# Patient Record
Sex: MALE | Race: WHITE | Employment: FULL TIME | URBAN - METROPOLITAN AREA
[De-identification: names, ages, dates, MRNs, and addresses within clinical notes are randomized per-mention and may not be internally consistent; named-entity substitution may affect disease eponyms.]

---

## 2017-01-20 ENCOUNTER — ANESTHESIA EVENT (OUTPATIENT)
Dept: SURGERY | Age: 47
End: 2017-01-20
Payer: COMMERCIAL

## 2017-01-21 NOTE — ANESTHESIA PREPROCEDURE EVALUATION
Anesthetic History   No history of anesthetic complications            Review of Systems / Medical History  Patient summary reviewed, nursing notes reviewed and pertinent labs reviewed    Pulmonary  Within defined limits                 Neuro/Psych   Within defined limits           Cardiovascular  Within defined limits                     GI/Hepatic/Renal  Within defined limits              Endo/Other        Arthritis     Other Findings            Physical Exam    Airway  Mallampati: II  TM Distance: > 6 cm  Neck ROM: normal range of motion   Mouth opening: Normal     Cardiovascular  Regular rate and rhythm,  S1 and S2 normal,  no murmur, click, rub, or gallop             Dental    Dentition: Upper dentition intact and Lower dentition intact     Pulmonary  Breath sounds clear to auscultation               Abdominal  GI exam deferred       Other Findings            Anesthetic Plan    ASA: 2  Anesthesia type: general          Induction: Intravenous  Anesthetic plan and risks discussed with: Patient

## 2017-01-23 ENCOUNTER — ANESTHESIA (OUTPATIENT)
Dept: SURGERY | Age: 47
End: 2017-01-23
Payer: COMMERCIAL

## 2017-01-23 ENCOUNTER — SURGERY (OUTPATIENT)
Age: 47
End: 2017-01-23

## 2017-01-23 PROCEDURE — 77030026438 HC STYL ET INTUB CARD -A: Performed by: ANESTHESIOLOGY

## 2017-01-23 PROCEDURE — 74011250636 HC RX REV CODE- 250/636: Performed by: PHYSICIAN ASSISTANT

## 2017-01-23 PROCEDURE — 77030008684 HC TU ET CUF COVD -B: Performed by: ANESTHESIOLOGY

## 2017-01-23 PROCEDURE — 74011250636 HC RX REV CODE- 250/636

## 2017-01-23 PROCEDURE — 77030013079 HC BLNKT BAIR HGGR 3M -A: Performed by: ANESTHESIOLOGY

## 2017-01-23 PROCEDURE — 74011000250 HC RX REV CODE- 250

## 2017-01-23 RX ORDER — KETAMINE HYDROCHLORIDE 10 MG/ML
INJECTION, SOLUTION INTRAMUSCULAR; INTRAVENOUS AS NEEDED
Status: DISCONTINUED | OUTPATIENT
Start: 2017-01-23 | End: 2017-01-23 | Stop reason: HOSPADM

## 2017-01-23 RX ORDER — ONDANSETRON 2 MG/ML
INJECTION INTRAMUSCULAR; INTRAVENOUS AS NEEDED
Status: DISCONTINUED | OUTPATIENT
Start: 2017-01-23 | End: 2017-01-23 | Stop reason: HOSPADM

## 2017-01-23 RX ORDER — LIDOCAINE HYDROCHLORIDE 20 MG/ML
INJECTION, SOLUTION EPIDURAL; INFILTRATION; INTRACAUDAL; PERINEURAL AS NEEDED
Status: DISCONTINUED | OUTPATIENT
Start: 2017-01-23 | End: 2017-01-23 | Stop reason: HOSPADM

## 2017-01-23 RX ORDER — DEXAMETHASONE SODIUM PHOSPHATE 4 MG/ML
INJECTION, SOLUTION INTRA-ARTICULAR; INTRALESIONAL; INTRAMUSCULAR; INTRAVENOUS; SOFT TISSUE AS NEEDED
Status: DISCONTINUED | OUTPATIENT
Start: 2017-01-23 | End: 2017-01-23 | Stop reason: HOSPADM

## 2017-01-23 RX ORDER — SODIUM CHLORIDE, SODIUM LACTATE, POTASSIUM CHLORIDE, CALCIUM CHLORIDE 600; 310; 30; 20 MG/100ML; MG/100ML; MG/100ML; MG/100ML
INJECTION, SOLUTION INTRAVENOUS
Status: DISCONTINUED | OUTPATIENT
Start: 2017-01-23 | End: 2017-01-23 | Stop reason: HOSPADM

## 2017-01-23 RX ORDER — PROPOFOL 10 MG/ML
INJECTION, EMULSION INTRAVENOUS AS NEEDED
Status: DISCONTINUED | OUTPATIENT
Start: 2017-01-23 | End: 2017-01-23 | Stop reason: HOSPADM

## 2017-01-23 RX ORDER — FENTANYL CITRATE 50 UG/ML
INJECTION, SOLUTION INTRAMUSCULAR; INTRAVENOUS AS NEEDED
Status: DISCONTINUED | OUTPATIENT
Start: 2017-01-23 | End: 2017-01-23 | Stop reason: HOSPADM

## 2017-01-23 RX ORDER — MIDAZOLAM HYDROCHLORIDE 1 MG/ML
INJECTION, SOLUTION INTRAMUSCULAR; INTRAVENOUS AS NEEDED
Status: DISCONTINUED | OUTPATIENT
Start: 2017-01-23 | End: 2017-01-23 | Stop reason: HOSPADM

## 2017-01-23 RX ADMIN — SODIUM CHLORIDE, SODIUM LACTATE, POTASSIUM CHLORIDE, CALCIUM CHLORIDE: 600; 310; 30; 20 INJECTION, SOLUTION INTRAVENOUS at 07:36

## 2017-01-23 RX ADMIN — FENTANYL CITRATE 25 MCG: 50 INJECTION, SOLUTION INTRAMUSCULAR; INTRAVENOUS at 07:36

## 2017-01-23 RX ADMIN — PROPOFOL 200 MG: 10 INJECTION, EMULSION INTRAVENOUS at 07:46

## 2017-01-23 RX ADMIN — FENTANYL CITRATE 50 MCG: 50 INJECTION, SOLUTION INTRAMUSCULAR; INTRAVENOUS at 07:48

## 2017-01-23 RX ADMIN — MIDAZOLAM HYDROCHLORIDE 3 MG: 1 INJECTION, SOLUTION INTRAMUSCULAR; INTRAVENOUS at 07:36

## 2017-01-23 RX ADMIN — ONDANSETRON 4 MG: 2 INJECTION INTRAMUSCULAR; INTRAVENOUS at 08:06

## 2017-01-23 RX ADMIN — DEXAMETHASONE SODIUM PHOSPHATE 8 MG: 4 INJECTION, SOLUTION INTRA-ARTICULAR; INTRALESIONAL; INTRAMUSCULAR; INTRAVENOUS; SOFT TISSUE at 07:56

## 2017-01-23 RX ADMIN — CEFAZOLIN 2 G: 1 INJECTION, POWDER, FOR SOLUTION INTRAMUSCULAR; INTRAVENOUS; PARENTERAL at 07:50

## 2017-01-23 RX ADMIN — KETAMINE HYDROCHLORIDE 30 MG: 10 INJECTION, SOLUTION INTRAMUSCULAR; INTRAVENOUS at 07:50

## 2017-01-23 RX ADMIN — FENTANYL CITRATE 25 MCG: 50 INJECTION, SOLUTION INTRAMUSCULAR; INTRAVENOUS at 07:51

## 2017-01-23 RX ADMIN — LIDOCAINE HYDROCHLORIDE 40 MG: 20 INJECTION, SOLUTION EPIDURAL; INFILTRATION; INTRACAUDAL; PERINEURAL at 07:46

## 2017-01-23 NOTE — ANESTHESIA POSTPROCEDURE EVALUATION
Post-Anesthesia Evaluation and Assessment    Patient: Rigoberto Juan MRN: [id number]  SSN: [id number]    YOB: 1970  Age: 56 y.o. Sex: male       Cardiovascular Function/Vital Signs  Visit Vitals    /75    Pulse (!) 40    Temp 36.7 °C (98 °F)    Resp 13    Ht 6' 3\" (1.905 m)    Wt 99.8 kg (220 lb 0.3 oz)    SpO2 97%    BMI 27.5 kg/m2       Patient is status post general anesthesia for Procedure(s):  LEFT KNEE HARDWARE REMOVAL. Nausea/Vomiting: None    Postoperative hydration reviewed and adequate. Pain:  Pain Scale 1: Numeric (0 - 10) (01/23/17 0937)  Pain Intensity 1: 4 (01/23/17 0937)   Managed    Neurological Status:   Neuro (WDL): Within Defined Limits (01/23/17 0937)  Neuro  Neurologic State: Alert (01/23/17 5592)  LUE Motor Response: Purposeful (01/23/17 0830)  LLE Motor Response: Purposeful (01/23/17 0830)  RUE Motor Response: Purposeful (01/23/17 0830)  RLE Motor Response: Purposeful (01/23/17 0830)   At baseline    Mental Status and Level of Consciousness: Arousable    Pulmonary Status:   O2 Device: Room air (01/23/17 0937)   Adequate oxygenation and airway patent    Complications related to anesthesia: None    Post-anesthesia assessment completed.  No concerns    Signed By: Mirna Hagen MD     January 23, 2017

## 2017-08-15 ENCOUNTER — OFFICE VISIT (OUTPATIENT)
Dept: FAMILY MEDICINE CLINIC | Age: 47
End: 2017-08-15

## 2017-08-15 VITALS
HEIGHT: 75 IN | SYSTOLIC BLOOD PRESSURE: 128 MMHG | TEMPERATURE: 98.7 F | HEART RATE: 66 BPM | RESPIRATION RATE: 16 BRPM | WEIGHT: 231.6 LBS | OXYGEN SATURATION: 98 % | BODY MASS INDEX: 28.8 KG/M2 | DIASTOLIC BLOOD PRESSURE: 70 MMHG

## 2017-08-15 DIAGNOSIS — K12.0 CANKER SORES ORAL: Primary | ICD-10-CM
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
## 2017-08-15 NOTE — MR AVS SNAPSHOT
Visit Information Date & Time Provider Department Dept. Phone Encounter #  
 8/15/2017  9:45 AM Vinayak Kowalski  [facility] [phone] [id number] Upcoming Health Maintenance Date Due DTaP/Tdap/Td series (1 - Tdap) 1/11/1992 INFLUENZA AGE 9 TO ADULT 8/1/2017 Allergies as of 8/15/2017  Review Complete On: 8/15/2017 By: Vinayak Kowalski MD  
  
 Severity Noted Reaction Type Reactions Nsaids (Non-steroidal Anti-inflammatory Drug)  08/15/2017    Other (comments) H/O Gastric bypass Pcn [Penicillins]  04/05/2013    Hives Current Immunizations  Never Reviewed No immunizations on file. Not reviewed this visit You Were Diagnosed With   
  
 Codes Comments Canker sores oral    -  Primary ICD-10-CM: K12.0 ICD-9-CM: 528.2 Vitals BP Pulse Temp Resp Height(growth percentile) Weight(growth percentile) 128/70 (BP 1 Location: Left arm, BP Patient Position: Sitting) 66 98.7 °F (37.1 °C) (Oral) 16 6' 3\" (1.905 m) 231 lb 9.6 oz (105.1 kg) SpO2 BMI Smoking Status 98% 28.95 kg/m2 Never Smoker Vitals History BMI and BSA Data Body Mass Index Body Surface Area  
 28.95 kg/m 2 2.36 m 2 Preferred Pharmacy Pharmacy Name Phone [address] [phone] Your Updated Medication List  
  
   
This list is accurate as of: 8/15/17 10:19 AM.  Always use your most recent med list.  
  
  
  
  
 calcium carbonate 600 mg calcium (1,500 mg) tablet Commonly known as:  Onsarah Fairelbach Take 600 mg by mouth two (2) times a day. cyanocobalamin (vitamin B-12) 1,500 mcg Tbdi Take  by mouth. FLINTSTONES COMPLETE Chew Generic drug:  pediatric multivitamin no.76 Take 1 Tab by mouth two (2) times a day. magic mouthwash solution Take 5 mL by mouth three (3) times daily as needed (painful mouth sores). Magic mouth wash  Maalox Lidocaine 2% viscous  Diphenhydramine oral solution   Pharmacy to mix equal portions of ingredients to a total volume as indicated in the dispense amount. TYLENOL EXTRA STRENGTH PO Take 2 Tabs by mouth as needed. Prescriptions Printed Refills  
 magic mouthwash solution 0 Sig: Take 5 mL by mouth three (3) times daily as needed (painful mouth sores). Magic mouth wash Maalox Lidocaine 2% viscous Diphenhydramine oral solution Pharmacy to mix equal portions of ingredients to a total volume as indicated in the dispense amount. Class: Print Route: Oral  
  
Patient Instructions Canker Sore: Care Instructions Your Care Instructions Canker sores are painful white sores in the mouth. They usually begin with a tingling feeling, followed by a red spot or bump that turns white. Canker sores appear most often on the tongue, inside the cheeks, and inside the lips. They can be very painful and can make talking, eating, and drinking difficult. A canker sore may form after an injury or stretching of tissues in the mouth, which can happen, for example, during a dental procedure or teeth cleaning. If you accidentally bite your tongue or the inside of your cheek, you may end up with a canker sore. Other possible causes are infection, certain foods, and stress. Canker sores are not contagious. The pain from your canker sore should decrease in 7 to 10 days, and it should heal completely in 1 to 3 weeks. In most cases, a canker sore will go away by itself. Home treatment can ease pain and discomfort. If you have a large or deep canker sore that does not seem to be getting better after 2 weeks, your doctor may prescribe medicine. Canker sores often come back again. Follow-up care is a key part of your treatment and safety.  Be sure to make and go to all appointments, and call your doctor if you are having problems. It's also a good idea to know your test results and keep a list of the medicines you take. How can you care for yourself at home? · Drink cold liquids, such as water or iced tea, or eat flavored ice pops or frozen juices. Use a straw to keep the liquid from coming in contact with your canker sore. · Eat soft, bland foods that are easy to chew and swallow, such as ice cream, custard, applesauce, cottage cheese, macaroni and cheese, soft-cooked eggs, yogurt, or cream soups. · Cut foods into small pieces, or grind, mash, blend, or puree foods to make them easier to chew and swallow. · While your canker sore heals, avoid coffee, chocolate, spicy and salty foods, citrus fruits, nuts, seeds, and tomatoes. · To soothe your canker sore and help it heal: ¨ Use an over-the-counter numbing medicine, such as Orabase or Anbesol. ¨ Dab a bit of Milk of Magnesia on the canker sore 3 or 4 times a day. · Put ice on your sore to reduce the pain. · Take anti-inflammatory medicines to reduce pain, as needed. These include ibuprofen (Advil, Motrin) and naproxen (Aleve). Read and follow all instructions on the label. · Use a soft-bristle toothbrush, and brush your teeth well but carefully. · Do not smoke or use spit tobacco. Tobacco can cause mouth problems and slow healing. If you need help quitting, talk to your doctor about stop-smoking programs and medicines. These can increase your chances of quitting for good. When should you call for help? Call your doctor now or seek immediate medical care if: 
· You have signs of infection, such as: 
¨ Increased pain, swelling, warmth, or redness. ¨ Red streaks leading from the area. ¨ Pus draining from the area. ¨ A fever. Watch closely for changes in your health, and be sure to contact your doctor if: 
· You do not get better as expected. Where can you learn more? Go to http://aguilar-maria luisa.info/. Enter V214 in the search box to learn more about \"Kinga Alcantar: Care Instructions. \" Current as of: December 28, 2016 Content Version: 11.3 © 3608-7981 [employer]. Care instructions adapted under license by [employer] (which disclaims liability or warranty for this information). If you have questions about a medical condition or this instruction, always ask your healthcare professional. [address] any warranty or liability for your use of this information. Introducing [facility]! [facility] introduces Cheggin patient portal. Now you can access parts of your medical record, email your doctor's office, and request medication refills online. 1. In your internet browser, go to https://[employer]. [employer] 2. Click on the First Time User? Click Here link in the Sign In box. You will see the New Member Sign Up page. 3. Enter your Cheggin Access Code exactly as it appears below. You will not need to use this code after youve completed the sign-up process. If you do not sign up before the expiration date, you must request a new code. · Cheggin Access Code: [email or web address] Expires: 11/13/2017  9:47 AM 
 
4. Enter the last four digits of your Social Security Number (xxxx) and Date of Birth (mm/dd/yyyy) as indicated and click Submit. You will be taken to the next sign-up page. 5. Create a Cheggin ID. This will be your Cheggin login ID and cannot be changed, so think of one that is secure and easy to remember. 6. Create a Cheggin password. You can change your password at any time. 7. Enter your Password Reset Question and Answer. This can be used at a later time if you forget your password. 8. Enter your e-mail address. You will receive e-mail notification when new information is available in [address] 9. Click Sign Up. You can now view and download portions of your medical record. 10. Click the Download Summary menu link to download a portable copy of your medical information. If you have questions, please visit the Frequently Asked Questions section of the [employer] website. Remember, [employer]ix is NOT to be used for urgent needs. For medical emergencies, dial 911. Now available from your iPhone and Android! Please provide this summary of care documentation to your next provider. Your primary care clinician is listed as CARMEN REZA. If you have any questions after today's visit, please call [phone].

## 2017-08-15 NOTE — PATIENT INSTRUCTIONS
Canker Sore: Care Instructions  Your Care Instructions  Canker sores are painful white sores in the mouth. They usually begin with a tingling feeling, followed by a red spot or bump that turns white. Canker sores appear most often on the tongue, inside the cheeks, and inside the lips. They can be very painful and can make talking, eating, and drinking difficult. A canker sore may form after an injury or stretching of tissues in the mouth, which can happen, for example, during a dental procedure or teeth cleaning. If you accidentally bite your tongue or the inside of your cheek, you may end up with a canker sore. Other possible causes are infection, certain foods, and stress. Canker sores are not contagious. The pain from your canker sore should decrease in 7 to 10 days, and it should heal completely in 1 to 3 weeks. In most cases, a canker sore will go away by itself. Home treatment can ease pain and discomfort. If you have a large or deep canker sore that does not seem to be getting better after 2 weeks, your doctor may prescribe medicine. Canker sores often come back again. Follow-up care is a key part of your treatment and safety. Be sure to make and go to all appointments, and call your doctor if you are having problems. It's also a good idea to know your test results and keep a list of the medicines you take. How can you care for yourself at home? · Drink cold liquids, such as water or iced tea, or eat flavored ice pops or frozen juices. Use a straw to keep the liquid from coming in contact with your canker sore. · Eat soft, bland foods that are easy to chew and swallow, such as ice cream, custard, applesauce, cottage cheese, macaroni and cheese, soft-cooked eggs, yogurt, or cream soups. · Cut foods into small pieces, or grind, mash, blend, or puree foods to make them easier to chew and swallow.   · While your canker sore heals, avoid coffee, chocolate, spicy and salty foods, citrus fruits, nuts, seeds, and tomatoes. · To soothe your canker sore and help it heal:  ¨ Use an over-the-counter numbing medicine, such as Orabase or Anbesol. ¨ Dab a bit of Milk of Magnesia on the canker sore 3 or 4 times a day. · Put ice on your sore to reduce the pain. · Take anti-inflammatory medicines to reduce pain, as needed. These include ibuprofen (Advil, Motrin) and naproxen (Aleve). Read and follow all instructions on the label. · Use a soft-bristle toothbrush, and brush your teeth well but carefully. · Do not smoke or use spit tobacco. Tobacco can cause mouth problems and slow healing. If you need help quitting, talk to your doctor about stop-smoking programs and medicines. These can increase your chances of quitting for good. When should you call for help? Call your doctor now or seek immediate medical care if:  · You have signs of infection, such as:  ¨ Increased pain, swelling, warmth, or redness. ¨ Red streaks leading from the area. ¨ Pus draining from the area. ¨ A fever. Watch closely for changes in your health, and be sure to contact your doctor if:  · You do not get better as expected. Where can you learn more? Go to http://aguilar-maria luisa.info/. Enter E773 in the search box to learn more about \"Canker Sore: Care Instructions. \"  Current as of: December 28, 2016  Content Version: 11.3  © 8172-7145 [employer]. Care instructions adapted under license by [employer] (which disclaims liability or warranty for this information). If you have questions about a medical condition or this instruction, always ask your healthcare professional. [place] [address] any warranty or liability for your use of this information.

## 2017-08-15 NOTE — PROGRESS NOTES
1. Have you been to the ER, urgent care clinic since your last visit? Hospitalized since your last visit? No    2. Have you seen or consulted any other health care providers outside of the [address] [place] [address] since your last visit? Include any pap smears or colon screening. No       Chief Complaint   Patient presents with    Mouth Lesions     For the past 1 1/2 weeks, patient noticed some mouth lesions, also has been having a sore throat, bilateral ear pain, and feels like something is caught in the back of the throat.       Not fasting

## 2019-03-23 ENCOUNTER — OFFICE VISIT (OUTPATIENT)
Dept: FAMILY MEDICINE CLINIC | Age: 49
End: 2019-03-23

## 2019-03-23 VITALS
BODY MASS INDEX: 31.08 KG/M2 | SYSTOLIC BLOOD PRESSURE: 137 MMHG | WEIGHT: 250 LBS | RESPIRATION RATE: 18 BRPM | DIASTOLIC BLOOD PRESSURE: 91 MMHG | OXYGEN SATURATION: 96 % | TEMPERATURE: 98.6 F | HEIGHT: 75 IN | HEART RATE: 61 BPM

## 2019-03-23 DIAGNOSIS — J11.1 INFLUENZA: Primary | ICD-10-CM

## 2019-03-23 RX ORDER — METHYLPREDNISOLONE 4 MG/1
TABLET ORAL
Qty: 1 DOSE PACK | Refills: 0 | Status: SHIPPED | OUTPATIENT
Start: 2019-03-23 | End: 2019-09-14

## 2019-03-23 RX ORDER — OSELTAMIVIR PHOSPHATE 75 MG/1
CAPSULE ORAL
COMMUNITY
End: 2019-09-14

## 2019-03-23 NOTE — PROGRESS NOTES
Assessment/Plan:     Diagnoses and all orders for this visit:    1. Influenza  -     methylPREDNISolone (MEDROL DOSEPACK) 4 mg tablet; UAD    He is traveling to [facility] soon. He will travel with Medrol although he will hold on taking as he is improving. Follow up as needed. Follow-up and Dispositions    · Return if symptoms worsen or fail to improve. Discussed expected course/resolution/complications of diagnosis in detail with patient.    Medication risks/benefits/costs/interactions/alternatives discussed with patient.    Pt was given after visit summary which includes diagnoses, current medications & vitals. Pt expressed understanding with the diagnosis and plan          Subjective:      Bina Caputo is a 51 y.o. male who presents for had concerns including Flu. Hospital Follow Up  Bina Caputo is seen for follow up from recent urgent care visit to a [address] [place] [address] on 3/20/2019. He contracted symptoms the same day. He is currently on Day 4 of illness. He presented with nausea, sore throat. He is taking his Tamiflu as directed & without any side effects. He reports symptoms are not changed. Medication Reconciliation reviewed today. He is taking Tussin without improvement. He did get a flu shot. Current Outpatient Medications   Medication Sig Dispense Refill    oseltamivir (TAMIFLU) 75 mg capsule Take  by mouth.  methylPREDNISolone (MEDROL DOSEPACK) 4 mg tablet UAD 1 Dose Pack 0    magic mouthwash solution Take 5 mL by mouth three (3) times daily as needed (painful mouth sores). Magic mouth wash   Maalox  Lidocaine 2% viscous   Diphenhydramine oral solution     Pharmacy to mix equal portions of ingredients to a total volume as indicated in the dispense amount. 60 mL 0    ACETAMINOPHEN (TYLENOL EXTRA STRENGTH PO) Take 2 Tabs by mouth as needed.  pediatric multivit comb no.76 (FLINTSTONES COMPLETE) chew Take 1 Tab by mouth two (2) times a day.       calcium carbonate (CALTREX) 600 mg (1,500 mg) tablet Take 600 mg by mouth two (2) times a day.  cyanocobalamin, vitamin B-12, 1,500 mcg TbDL Take  by mouth. Allergies   Allergen Reactions    Nsaids (Non-Steroidal Anti-Inflammatory Drug) Other (comments)     H/O Gastric bypass    Pcn [Penicillins] Hives       ROS:   Review of Systems   Constitutional: Positive for chills and malaise/fatigue. Negative for fever. HENT: Negative for congestion, ear pain, sinus pain and sore throat. Respiratory: Positive for cough. Negative for sputum production, shortness of breath and wheezing. Cardiovascular: Negative for chest pain. Neurological: Negative for seizures. Endo/Heme/Allergies: Negative for environmental allergies. Objective:     Visit Vitals  BP (!) 137/91   Pulse 61   Temp 98.6 °F (37 °C) (Oral)   Resp 18   Ht 6' 3\" (1.905 m)   Wt 250 lb (113.4 kg)   SpO2 96%   BMI 31.25 kg/m²       Vitals and Nurse Documentation reviewed. Physical Exam   Constitutional: He has a sickly appearance. No distress. HENT:   Right Ear: Tympanic membrane is not erythematous and not bulging. No middle ear effusion. Left Ear: Tympanic membrane is not erythematous and not bulging. No middle ear effusion. Nose: No rhinorrhea. Right sinus exhibits no maxillary sinus tenderness and no frontal sinus tenderness. Left sinus exhibits no maxillary sinus tenderness and no frontal sinus tenderness. Mouth/Throat: No oropharyngeal exudate or posterior oropharyngeal erythema. Eyes: EOM and lids are normal.   Cardiovascular: S1 normal and S2 normal. Exam reveals no gallop and no friction rub. No murmur heard. Pulmonary/Chest: Breath sounds normal. He has no wheezes. Lymphadenopathy:     He has no cervical adenopathy. Skin: Skin is warm and dry.    Psychiatric: Mood and affect normal.

## 2019-03-23 NOTE — PROGRESS NOTES
Chief Complaint   Patient presents with    Flu     Pt presents in office today with c/o cough, sore throat, body aches   Pt reports being diagnosis ed with the flu on 03/20/2019  Pt is taking Tamiflu    1. Have you been to the ER, urgent care clinic since your last visit? Hospitalized since your last visit? No    2. Have you seen or consulted any other health care providers outside of the [address] [place] [address] since your last visit? Include any pap smears or colon screening.  No

## 2019-03-23 NOTE — PATIENT INSTRUCTIONS
Influenza (Flu): Care Instructions  Your Care Instructions    Influenza (flu) is an infection in the lungs and breathing passages. It is caused by the influenza virus. There are different strains, or types, of the flu virus from year to year. Unlike the common cold, the flu comes on suddenly and the symptoms, such as a cough, congestion, fever, chills, fatigue, aches, and pains, are more severe. These symptoms may last up to 10 days. Although the flu can make you feel very sick, it usually doesn't cause serious health problems. Home treatment is usually all you need for flu symptoms. But your doctor may prescribe antiviral medicine to prevent other health problems, such as pneumonia, from developing. Older people and those who have a long-term health condition, such as lung disease, are most at risk for having pneumonia or other health problems. Follow-up care is a key part of your treatment and safety. Be sure to make and go to all appointments, and call your doctor if you are having problems. It's also a good idea to know your test results and keep a list of the medicines you take. How can you care for yourself at home? · Get plenty of rest.  · Drink plenty of fluids, enough so that your urine is light yellow or clear like water. If you have kidney, heart, or liver disease and have to limit fluids, talk with your doctor before you increase the amount of fluids you drink. · Take an over-the-counter pain medicine if needed, such as acetaminophen (Tylenol), ibuprofen (Advil, Motrin), or naproxen (Aleve), to relieve fever, headache, and muscle aches. Read and follow all instructions on the label. No one younger than 20 should take aspirin. It has been linked to Reye syndrome, a serious illness. · Do not smoke. Smoking can make the flu worse. If you need help quitting, talk to your doctor about stop-smoking programs and medicines. These can increase your chances of quitting for good.   · Breathe moist air from a hot shower or from a sink filled with hot water to help clear a stuffy nose. · Before you use cough and cold medicines, check the label. These medicines may not be safe for young children or for people with certain health problems. · If the skin around your nose and lips becomes sore, put some petroleum jelly on the area. · To ease coughing:  ? Drink fluids to soothe a scratchy throat. ? Suck on cough drops or plain hard candy. ? Take an over-the-counter cough medicine that contains dextromethorphan to help you get some sleep. Read and follow all instructions on the label. ? Raise your head at night with an extra pillow. This may help you rest if coughing keeps you awake. · Take any prescribed medicine exactly as directed. Call your doctor if you think you are having a problem with your medicine. To avoid spreading the flu  · Wash your hands regularly, and keep your hands away from your face. · Stay home from school, work, and other public places until you are feeling better and your fever has been gone for at least 24 hours. The fever needs to have gone away on its own without the help of medicine. · Ask people living with you to talk to their doctors about preventing the flu. They may get antiviral medicine to keep from getting the flu from you. · To prevent the flu in the future, get a flu vaccine every fall. Encourage people living with you to get the vaccine. · Cover your mouth when you cough or sneeze. When should you call for help? Call 911 anytime you think you may need emergency care.  For example, call if:    · You have severe trouble breathing.    Call your doctor now or seek immediate medical care if:    · You have new or worse trouble breathing.     · You seem to be getting much sicker.     · You feel very sleepy or confused.     · You have a new or higher fever.     · You get a new rash.    Watch closely for changes in your health, and be sure to contact your doctor if:    · You begin to get better and then get worse.     · You are not getting better after 1 week. Where can you learn more? Go to http://aguilar-maria luisa.info/. Enter V798 in the search box to learn more about \"Influenza (Flu): Care Instructions. \"  Current as of: September 5, 2018  Content Version: 11.9  © 6193-3524 [employer]. Care instructions adapted under license by [employer] (which disclaims liability or warranty for this information). If you have questions about a medical condition or this instruction, always ask your healthcare professional. [place] [address] any warranty or liability for your use of this information.

## 2019-09-14 ENCOUNTER — HOSPITAL ENCOUNTER (EMERGENCY)
Age: 49
Discharge: HOME | End: 2019-09-14
Attending: EMERGENCY MEDICINE
Payer: COMMERCIAL

## 2019-09-14 ENCOUNTER — APPOINTMENT (OUTPATIENT)
Dept: CT IMAGING | Age: 49
End: 2019-09-14
Attending: NURSE PRACTITIONER
Payer: COMMERCIAL

## 2019-09-14 VITALS
RESPIRATION RATE: 16 BRPM | DIASTOLIC BLOOD PRESSURE: 88 MMHG | TEMPERATURE: 98.4 F | BODY MASS INDEX: 31.08 KG/M2 | HEART RATE: 59 BPM | WEIGHT: 250 LBS | SYSTOLIC BLOOD PRESSURE: 131 MMHG | OXYGEN SATURATION: 96 % | HEIGHT: 75 IN

## 2019-09-14 DIAGNOSIS — S09.90XA INJURY OF HEAD, INITIAL ENCOUNTER: Primary | ICD-10-CM

## 2019-09-14 DIAGNOSIS — R11.2 NON-INTRACTABLE VOMITING WITH NAUSEA, UNSPECIFIED VOMITING TYPE: ICD-10-CM

## 2019-09-14 DIAGNOSIS — Z78.9 ALCOHOL USE: ICD-10-CM

## 2019-09-14 LAB
COMMENT, HOLDF: NORMAL
SAMPLES BEING HELD,HOLD: NORMAL

## 2019-09-14 PROCEDURE — 74011250636 HC RX REV CODE- 250/636: Performed by: NURSE PRACTITIONER

## 2019-09-14 PROCEDURE — 99282 EMERGENCY DEPT VISIT SF MDM: CPT

## 2019-09-14 PROCEDURE — 74011000250 HC RX REV CODE- 250: Performed by: NURSE PRACTITIONER

## 2019-09-14 PROCEDURE — 74011250637 HC RX REV CODE- 250/637: Performed by: NURSE PRACTITIONER

## 2019-09-14 PROCEDURE — 70450 CT HEAD/BRAIN W/O DYE: CPT

## 2019-09-14 PROCEDURE — 96374 THER/PROPH/DIAG INJ IV PUSH: CPT

## 2019-09-14 RX ORDER — BUTALBITAL, ACETAMINOPHEN AND CAFFEINE 50; 325; 40 MG/1; MG/1; MG/1
1 TABLET ORAL
Status: COMPLETED | OUTPATIENT
Start: 2019-09-14 | End: 2019-09-14

## 2019-09-14 RX ORDER — ONDANSETRON 4 MG/1
4 TABLET, ORALLY DISINTEGRATING ORAL
Qty: 12 TAB | Refills: 0 | Status: SHIPPED | OUTPATIENT
Start: 2019-09-14

## 2019-09-14 RX ORDER — BACITRACIN 500 UNIT/G
1 PACKET (EA) TOPICAL
Status: COMPLETED | OUTPATIENT
Start: 2019-09-14 | End: 2019-09-14

## 2019-09-14 RX ORDER — ONDANSETRON 2 MG/ML
4 INJECTION INTRAMUSCULAR; INTRAVENOUS
Status: COMPLETED | OUTPATIENT
Start: 2019-09-14 | End: 2019-09-14

## 2019-09-14 RX ADMIN — SODIUM CHLORIDE 1000 ML: 900 INJECTION, SOLUTION INTRAVENOUS at 17:49

## 2019-09-14 RX ADMIN — BACITRACIN 1 PACKET: 500 OINTMENT TOPICAL at 17:52

## 2019-09-14 RX ADMIN — ONDANSETRON 4 MG: 2 INJECTION INTRAMUSCULAR; INTRAVENOUS at 17:49

## 2019-09-14 RX ADMIN — BUTALBITAL, ACETAMINOPHEN AND CAFFEINE 1 TABLET: 50; 325; 40 TABLET ORAL at 18:57

## 2019-09-14 NOTE — ED TRIAGE NOTES
Pt reports having a few beers and began to feel nauseated. Pt was in bathroom and fell hitting head on unknown object. Pt unsure in he had LOC. Incident happened 15 min ago.
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
## 2019-09-14 NOTE — DISCHARGE INSTRUCTIONS
Patient Education        Learning About a Closed Head Injury  What is a closed head injury? A closed head injury happens when your head gets hit hard. The strong force of the blow causes your brain to shake in your skull. This movement can cause the brain to bruise, swell, or tear. Sometimes nerves or blood vessels also get damaged. This can cause bleeding in or around the brain. A concussion is a type of closed head injury. What are the symptoms? If you have a mild concussion, you may have a mild headache or feel \"not quite right. \" These symptoms are common. They usually go away over a few days to 4 weeks. But sometimes after a concussion, you feel like you can't function as well as before the injury. And you have new symptoms. This is called postconcussive syndrome. You may:  · Find it harder to solve problems, think, concentrate, or remember. · Have headaches. · Have changes in your sleep patterns, such as not being able to sleep or sleeping all the time. · Have changes in your personality. · Not be interested in your usual activities. · Feel angry or anxious without a clear reason. · Lose your sense of taste or smell. · Be dizzy, lightheaded, or unsteady. It may be hard to stand or walk. How is a closed head injury treated? Any person who may have a concussion needs to see a doctor. Some people have to stay in the hospital to be watched. Others can go home safely. If you go home, follow your doctor's instructions. He or she will tell you if you need someone to watch you closely for the next 24 hours or longer. Rest is the best treatment. Get plenty of sleep at night. And try to rest during the day. · Avoid activities that are physically or mentally demanding. These include housework, exercise, and schoolwork. And don't play video games, send text messages, or use the computer. You may need to change your school or work schedule to be able to avoid these activities.   · Ask your doctor when it's okay to drive, ride a bike, or operate machinery. · Take an over-the-counter pain medicine, such as acetaminophen (Tylenol), ibuprofen (Advil, Motrin), or naproxen (Aleve). Be safe with medicines. Read and follow all instructions on the label. · Check with your doctor before you use any other medicines for pain. · Do not drink alcohol or use illegal drugs. They can slow recovery. They can also increase your risk of getting a second head injury. Follow-up care is a key part of your treatment and safety. Be sure to make and go to all appointments, and call your doctor if you are having problems. It's also a good idea to know your test results and keep a list of the medicines you take. Where can you learn more? Go to http://aguilar-maria luisa.info/. Enter E235 in the search box to learn more about \"Learning About a Closed Head Injury. \"  Current as of: March 28, 2019  Content Version: 12.1  © 0682-4949 [employer]. Care instructions adapted under license by [employer] (which disclaims liability or warranty for this information). If you have questions about a medical condition or this instruction, always ask your healthcare professional. [place] [address] any warranty or liability for your use of this information. Patient Education        Nausea and Vomiting: Care Instructions  Your Care Instructions    When you are nauseated, you may feel weak and sweaty and notice a lot of saliva in your mouth. Nausea often leads to vomiting. Most of the time you do not need to worry about nausea and vomiting, but they can be signs of other illnesses. Two common causes of nausea and vomiting are stomach flu and food poisoning. Nausea and vomiting from viral stomach flu will usually start to improve within 24 hours. Nausea and vomiting from food poisoning may last from 12 to 48 hours. The doctor has checked you carefully, but problems can develop later.  If you notice any problems or new symptoms, get medical treatment right away. Follow-up care is a key part of your treatment and safety. Be sure to make and go to all appointments, and call your doctor if you are having problems. It's also a good idea to know your test results and keep a list of the medicines you take. How can you care for yourself at home? · To prevent dehydration, drink plenty of fluids, enough so that your urine is light yellow or clear like water. Choose water and other caffeine-free clear liquids until you feel better. If you have kidney, heart, or liver disease and have to limit fluids, talk with your doctor before you increase the amount of fluids you drink. · Rest in bed until you feel better. · When you are able to eat, try clear soups, mild foods, and liquids until all symptoms are gone for 12 to 48 hours. Other good choices include dry toast, crackers, cooked cereal, and gelatin dessert, such as Jell-O. When should you call for help? Call 911 anytime you think you may need emergency care. For example, call if:    · You passed out (lost consciousness).    Call your doctor now or seek immediate medical care if:    · You have symptoms of dehydration, such as:  ? Dry eyes and a dry mouth. ? Passing only a little dark urine. ? Feeling thirstier than usual.     · You have new or worsening belly pain.     · You have a new or higher fever.     · You vomit blood or what looks like coffee grounds.    Watch closely for changes in your health, and be sure to contact your doctor if:    · You have ongoing nausea and vomiting.     · Your vomiting is getting worse.     · Your vomiting lasts longer than 2 days.     · You are not getting better as expected. Where can you learn more? Go to http://aguilar-maria luisa.info/. Enter 25 [id number] in the search box to learn more about \"Nausea and Vomiting: Care Instructions. \"  Current as of: September 23, 2018  Content Version: 12.1  © 8022-8573 [employer]. Care instructions adapted under license by [employer] (which disclaims liability or warranty for this information). If you have questions about a medical condition or this instruction, always ask your healthcare professional. [address] any warranty or liability for your use of this information.

## 2020-05-20 ENCOUNTER — VIRTUAL VISIT (OUTPATIENT)
Dept: FAMILY MEDICINE CLINIC | Age: 50
End: 2020-05-20

## 2020-05-20 DIAGNOSIS — Z13.0 SCREENING FOR ENDOCRINE, METABOLIC AND IMMUNITY DISORDER: ICD-10-CM

## 2020-05-20 DIAGNOSIS — R53.81 MALAISE AND FATIGUE: Primary | ICD-10-CM

## 2020-05-20 DIAGNOSIS — Z13.29 SCREENING FOR ENDOCRINE, METABOLIC AND IMMUNITY DISORDER: ICD-10-CM

## 2020-05-20 DIAGNOSIS — Z13.228 SCREENING FOR ENDOCRINE, METABOLIC AND IMMUNITY DISORDER: ICD-10-CM

## 2020-05-20 DIAGNOSIS — R06.83 SNORING: ICD-10-CM

## 2020-05-20 DIAGNOSIS — Z98.84 S/P GASTRIC BYPASS: ICD-10-CM

## 2020-05-20 DIAGNOSIS — R53.83 MALAISE AND FATIGUE: Primary | ICD-10-CM
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
## 2020-05-23 LAB
25(OH)D3+25(OH)D2 SERPL-MCNC: 22.8 NG/ML (ref 30–100)
ALBUMIN SERPL-MCNC: 4.5 G/DL (ref 4–5)
ALBUMIN/GLOB SERPL: 2 {RATIO} (ref 1.2–2.2)
ALP SERPL-CCNC: 70 IU/L (ref 39–117)
ALT SERPL-CCNC: 23 IU/L (ref 0–44)
AST SERPL-CCNC: 21 IU/L (ref 0–40)
BILIRUB SERPL-MCNC: 0.5 MG/DL (ref 0–1.2)
BUN SERPL-MCNC: 8 MG/DL (ref 6–24)
BUN/CREAT SERPL: 11 (ref 9–20)
CALCIUM SERPL-MCNC: 9.4 MG/DL (ref 8.7–10.2)
CHLORIDE SERPL-SCNC: 102 MMOL/L (ref 96–106)
CHOLEST SERPL-MCNC: 133 MG/DL (ref 100–199)
CO2 SERPL-SCNC: 28 MMOL/L (ref 20–29)
CREAT SERPL-MCNC: 0.71 MG/DL (ref 0.76–1.27)
ERYTHROCYTE [DISTWIDTH] IN BLOOD BY AUTOMATED COUNT: 12.3 % (ref 11.6–15.4)
GLOBULIN SER CALC-MCNC: 2.3 G/DL (ref 1.5–4.5)
GLUCOSE SERPL-MCNC: 87 MG/DL (ref 65–99)
HBA1C MFR BLD: 5.3 % (ref 4.8–5.6)
HCT VFR BLD AUTO: 45.3 % (ref 37.5–51)
HDLC SERPL-MCNC: 44 MG/DL
HGB BLD-MCNC: 14.9 G/DL (ref 13–17.7)
INTERPRETATION, 910389: NORMAL
IRON SATN MFR SERPL: 24 % (ref 15–55)
IRON SERPL-MCNC: 97 UG/DL (ref 38–169)
LDLC SERPL CALC-MCNC: 74 MG/DL (ref 0–99)
MCH RBC QN AUTO: 28.7 PG (ref 26.6–33)
MCHC RBC AUTO-ENTMCNC: 32.9 G/DL (ref 31.5–35.7)
MCV RBC AUTO: 87 FL (ref 79–97)
PLATELET # BLD AUTO: 213 X10E3/UL (ref 150–450)
POTASSIUM SERPL-SCNC: 4.5 MMOL/L (ref 3.5–5.2)
PROT SERPL-MCNC: 6.8 G/DL (ref 6–8.5)
PSA SERPL-MCNC: 0.3 NG/ML (ref 0–4)
RBC # BLD AUTO: 5.19 X10E6/UL (ref 4.14–5.8)
REFLEX CRITERIA: NORMAL
SODIUM SERPL-SCNC: 141 MMOL/L (ref 134–144)
T4 FREE SERPL-MCNC: 1.09 NG/DL (ref 0.82–1.77)
TESTOST FREE SERPL-MCNC: 10.5 PG/ML (ref 6.8–21.5)
TESTOST SERPL-MCNC: 601.1 NG/DL (ref 264–916)
TIBC SERPL-MCNC: 406 UG/DL (ref 250–450)
TRIGL SERPL-MCNC: 76 MG/DL (ref 0–149)
TSH SERPL DL<=0.005 MIU/L-ACNC: 2.55 UIU/ML (ref 0.45–4.5)
UIBC SERPL-MCNC: 309 UG/DL (ref 111–343)
VIT B12 SERPL-MCNC: 419 PG/ML (ref 232–1245)
VLDLC SERPL CALC-MCNC: 15 MG/DL (ref 5–40)
WBC # BLD AUTO: 5.3 X10E3/UL (ref 3.4–10.8)

## 2020-06-11 ENCOUNTER — TELEPHONE (OUTPATIENT)
Dept: FAMILY MEDICINE CLINIC | Age: 50
End: 2020-06-11

## 2020-06-11 NOTE — TELEPHONE ENCOUNTER
----- Message from Iban Call sent at 6/11/2020 10:06 AM EDT -----  Regarding: Clark MCKEON/Telephone  Env Patient return call    Caller's first and last name and relationship (if not the patient):      Best contact number(s): [phone]      Whose call is being returned: Returning a missed call from Ajit Davis.        Details to clarify the request:      Iban Call

## 2020-06-11 NOTE — TELEPHONE ENCOUNTER
----- Message from Rocky Murguia sent at 6/11/2020 10:06 AM EDT -----  Regarding: Clark MCKEON/Telephone  Env Patient return call    Caller's first and last name and relationship (if not the patient):      Best contact number(s): [phone]      Whose call is being returned: Returning a missed call from [place].        Details to clarify the request:      Rocky Murguia      Returning your call   Please advise  BCB#[phone] (K)

## 2020-06-11 NOTE — TELEPHONE ENCOUNTER
Outbound call to patient.  No answer left message for patient that I was returning his call regarding lab results

## 2020-06-11 NOTE — TELEPHONE ENCOUNTER
Call placed to patient. Name and  verified. Reviewed recent lab results and recommendations.  Patient expressed understanding and was appreciative of call

## 2020-12-15 ENCOUNTER — OFFICE VISIT (OUTPATIENT)
Dept: FAMILY MEDICINE CLINIC | Age: 50
End: 2020-12-15
Payer: COMMERCIAL

## 2020-12-15 VITALS
HEART RATE: 63 BPM | BODY MASS INDEX: 34.24 KG/M2 | TEMPERATURE: 98.2 F | HEIGHT: 75 IN | OXYGEN SATURATION: 97 % | SYSTOLIC BLOOD PRESSURE: 141 MMHG | RESPIRATION RATE: 16 BRPM | WEIGHT: 275.4 LBS | DIASTOLIC BLOOD PRESSURE: 88 MMHG

## 2020-12-15 DIAGNOSIS — E66.9 OBESITY (BMI 30-39.9): ICD-10-CM

## 2020-12-15 DIAGNOSIS — M17.12 PRIMARY OSTEOARTHRITIS OF LEFT KNEE: ICD-10-CM

## 2020-12-15 DIAGNOSIS — M54.6 ACUTE RIGHT-SIDED THORACIC BACK PAIN: ICD-10-CM

## 2020-12-15 DIAGNOSIS — M25.50 POLYARTHRALGIA: Primary | ICD-10-CM

## 2020-12-15 PROCEDURE — 99214 OFFICE O/P EST MOD 30 MIN: CPT | Performed by: NURSE PRACTITIONER

## 2020-12-15 RX ORDER — DICLOFENAC SODIUM 10 MG/G
4 GEL TOPICAL 4 TIMES DAILY
Qty: 100 G | Refills: 3 | Status: SHIPPED | OUTPATIENT
Start: 2020-12-15

## 2020-12-15 NOTE — PROGRESS NOTES
[facility] Note    Roseanne Holly is a 53 y.o. male who was seen in clinic today (12/15/2020). Subjective:  Cardiovascular Review:  The patient has a h/o obesity and prediabetes. Diet and Lifestyle: generally follows a low fat low cholesterol diet, generally follows a low sodium diet, exercises regularly, nonsmoker  Home BP Monitoring: is not measured at home. Pertinent ROS: no TIA's, no chest pain on exertion, no dyspnea on exertion, no swelling of ankles.      Patient had gastric bypass surgery in 2007. Reports compliance with his vitamin supplements. He continues to exercise routinely and is an avid runner. Patient underwent left knee MCL repair in 1/2017. Patient reports ongoing bilateral foot pain and cracking of heels. Patient also reports ongoing cramping of back. Prior to Admission medications    Medication Sig Start Date End Date Taking? Authorizing Provider   diclofenac (VOLTAREN) 1 % gel Apply 4 g to affected area four (4) times daily. 12/15/20  Yes Aj Rodas NP   ACETAMINOPHEN (TYLENOL EXTRA STRENGTH PO) Take 2 Tabs by mouth as needed. Yes Provider, Historical   pediatric multivit comb no.76 (FLINTSTONES COMPLETE) chew Take 1 Tab by mouth two (2) times a day. Yes Provider, Historical   calcium carbonate (CALTREX) 600 mg (1,500 mg) tablet Take 600 mg by mouth two (2) times a day. Yes Provider, Historical   cyanocobalamin, vitamin B-12, 1,500 mcg TbDL Take  by mouth. Yes Provider, Historical   ondansetron (ZOFRAN ODT) 4 mg disintegrating tablet Take 1 Tab by mouth every eight (8) hours as needed for Nausea. 9/14/19   Shellie Shankar NP          Allergies   Allergen Reactions    Nsaids (Non-Steroidal Anti-Inflammatory Drug) Other (comments)     H/O Gastric bypass    Pcn [Penicillins] Hives           ROS  See HPI    Objective:   Physical Exam  Vitals signs and nursing note reviewed.    Constitutional:       Appearance: He is well-developed. Neck:      Musculoskeletal: Normal range of motion and neck supple. Thyroid: No thyromegaly. Vascular: No carotid bruit or JVD. Cardiovascular:      Rate and Rhythm: Normal rate and regular rhythm. Heart sounds: No murmur. No friction rub. No gallop. Pulmonary:      Effort: Pulmonary effort is normal. No respiratory distress. Breath sounds: Normal breath sounds. Lymphadenopathy:      Cervical: No cervical adenopathy. Neurological:      Mental Status: He is alert and oriented to person, place, and time. Psychiatric:         Behavior: Behavior normal.           Visit Vitals  BP (!) 141/88 (BP 1 Location: Right arm, BP Patient Position: Sitting)   Pulse 63   Temp 98.2 °F (36.8 °C) (Temporal)   Resp 16   Ht 6' 3\" (1.905 m)   Wt 275 lb 6.4 oz (124.9 kg)   SpO2 97%   BMI 34.42 kg/m²       Assessment & Plan:  Diagnoses and all orders for this visit:    1. Polyarthralgia  Rule out inflammatory joint disease. Referral to rheumatology for continued symptoms or abnormal labs. -     SED RATE (ESR); Future  -     RA + CCP ABS; Future  -     C REACTIVE PROTEIN, QT; Future    2. Acute right-sided thoracic back pain  Handout provided for home exercises. NSAIDs as needed for pain. Recommended chiropractic evaluation through [facility]    3. Primary osteoarthritis of left knee  Unable to take NSAIDs due to history of gastric bypass surgery. Begin diclofenac gel. Referral to orthopedics for continued symptoms. -     diclofenac (VOLTAREN) 1 % gel; Apply 4 g to affected area four (4) times daily. 4. Obesity (BMI 30-39. 9)  Recommended weight loss through calorie restriction and exercise. I have discussed the diagnosis with the patient and the intended plan as seen in the above orders. The patient has received an after-visit summary along with patient information handout.   I have discussed medication side effects and warnings with the patient as well. Follow-up and Dispositions    · Return if symptoms worsen or fail to improve.            All Yan, NP

## 2020-12-15 NOTE — PATIENT INSTRUCTIONS
Back Stretches: Exercises Introduction Here are some examples of exercises for stretching your back. Start each exercise slowly. Ease off the exercise if you start to have pain. Your doctor or physical therapist will tell you when you can start these exercises and which ones will work best for you. How to do the exercises Overhead stretch 1. Stand comfortably with your feet shoulder-width apart. 2. Looking straight ahead, raise both arms over your head and reach toward the ceiling. Do not allow your head to tilt back. 3. Hold for 15 to 30 seconds, then lower your arms to your sides. 4. Repeat 2 to 4 times. Side stretch 1. Stand comfortably with your feet shoulder-width apart. 2. Raise one arm over your head, and then lean to the other side. 3. Slide your hand down your leg as you let the weight of your arm gently stretch your side muscles. Hold for 15 to 30 seconds. 4. Repeat 2 to 4 times on each side. Press-up 1. Lie on your stomach, supporting your body with your forearms. 2. Press your elbows down into the floor to raise your upper back. As you do this, relax your stomach muscles and allow your back to arch without using your back muscles. As your press up, do not let your hips or pelvis come off the floor. 3. Hold for 15 to 30 seconds, then relax. 4. Repeat 2 to 4 times. Relax and rest  
1. Lie on your back with a rolled towel under your neck and a pillow under your knees. Extend your arms comfortably to your sides. 2. Relax and breathe normally. 3. Remain in this position for about 10 minutes. 4. If you can, do this 2 or 3 times each day. Follow-up care is a key part of your treatment and safety. Be sure to make and go to all appointments, and call your doctor if you are having problems. It's also a good idea to know your test results and keep a list of the medicines you take. Where can you learn more? Go to http://www.[employer].com/ Enter D216 in the search box to learn more about \"Back Stretches: Exercises. \" Current as of: March 2, 2020               Content Version: 12.6 © 2006-2020 [employer]. Care instructions adapted under license by [employer] (which disclaims liability or warranty for this information). If you have questions about a medical condition or this instruction, always ask your healthcare professional. [place] [address] any warranty or liability for your use of this information.

## 2020-12-15 NOTE — PROGRESS NOTES
Chief Complaint   Patient presents with    Foot Pain     heel pain.  Weight Gain    Generalized Body Aches     every morning     1. Have you been to the ER, urgent care clinic since your last visit? Hospitalized since your last visit? No    2. Have you seen or consulted any other health care providers outside of the [address] [place] [address] since your last visit? Include any pap smears or colon screening.  No

## 2020-12-16 LAB
CCP IGA+IGG SERPL IA-ACNC: 25 UNITS (ref 0–19)
CRP SERPL-MCNC: <0.29 MG/DL (ref 0–0.6)
ERYTHROCYTE [SEDIMENTATION RATE] IN BLOOD: 4 MM/HR (ref 0–15)
RHEUMATOID FACT SERPL-ACNC: <10 IU/ML (ref 0–13.9)

## 2021-01-19 ENCOUNTER — DOCUMENTATION ONLY (OUTPATIENT)
Dept: RHEUMATOLOGY | Age: 51
End: 2021-01-19

## 2021-01-20 ENCOUNTER — TELEPHONE (OUTPATIENT)
Dept: RHEUMATOLOGY | Age: 51
End: 2021-01-20

## 2021-01-20 ENCOUNTER — HOSPITAL ENCOUNTER (OUTPATIENT)
Dept: GENERAL RADIOLOGY | Age: 51
Discharge: HOME | End: 2021-01-20
Attending: PEDIATRICS
Payer: COMMERCIAL

## 2021-01-20 ENCOUNTER — OFFICE VISIT (OUTPATIENT)
Dept: RHEUMATOLOGY | Age: 51
End: 2021-01-20
Payer: COMMERCIAL

## 2021-01-20 VITALS
OXYGEN SATURATION: 96 % | DIASTOLIC BLOOD PRESSURE: 81 MMHG | TEMPERATURE: 97.8 F | WEIGHT: 273.2 LBS | RESPIRATION RATE: 16 BRPM | SYSTOLIC BLOOD PRESSURE: 134 MMHG | BODY MASS INDEX: 34.15 KG/M2 | HEART RATE: 63 BPM

## 2021-01-20 DIAGNOSIS — M19.09 OSTEOARTHRITIS OF OTHER SITE, UNSPECIFIED OSTEOARTHRITIS TYPE: Primary | ICD-10-CM

## 2021-01-20 DIAGNOSIS — M47.818 SI JOINT ARTHRITIS: Primary | ICD-10-CM

## 2021-01-20 DIAGNOSIS — M19.09 OSTEOARTHRITIS OF OTHER SITE, UNSPECIFIED OSTEOARTHRITIS TYPE: ICD-10-CM

## 2021-01-20 PROCEDURE — 73560 X-RAY EXAM OF KNEE 1 OR 2: CPT

## 2021-01-20 PROCEDURE — 72100 X-RAY EXAM L-S SPINE 2/3 VWS: CPT

## 2021-01-20 PROCEDURE — 99244 OFF/OP CNSLTJ NEW/EST MOD 40: CPT | Performed by: PEDIATRICS

## 2021-01-20 PROCEDURE — 73630 X-RAY EXAM OF FOOT: CPT

## 2021-01-20 PROCEDURE — 72050 X-RAY EXAM NECK SPINE 4/5VWS: CPT

## 2021-01-20 PROCEDURE — 72202 X-RAY EXAM SI JOINTS 3/> VWS: CPT

## 2021-01-20 PROCEDURE — 72070 X-RAY EXAM THORAC SPINE 2VWS: CPT

## 2021-01-20 RX ORDER — CELECOXIB 200 MG/1
400 CAPSULE ORAL 2 TIMES DAILY
Qty: 120 CAP | Refills: 3 | Status: SHIPPED | OUTPATIENT
Start: 2021-01-20 | End: 2021-02-19

## 2021-01-20 RX ORDER — DICLOFENAC SODIUM 10 MG/G
GEL TOPICAL 4 TIMES DAILY
Qty: 100 G | Refills: 3 | Status: SHIPPED | OUTPATIENT
Start: 2021-01-20 | End: 2021-02-19

## 2021-01-20 NOTE — TELEPHONE ENCOUNTER
Spoke to Rosmery Conrad (pharmacist at [facility]) clarified dosage for the pt diclofenac gel, Subhash Lindsay verbally acknowledged understanding

## 2021-01-20 NOTE — PROGRESS NOTES
Chief Complaint   Patient presents with    Joint Pain     1. Have you been to the ER, urgent care clinic since your last visit? Hospitalized since your last visit? No    2. Have you seen or consulted any other health care providers outside of the [address] [place] [address] since your last visit? Include any pap smears or colon screening.  No
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
## 2021-01-20 NOTE — TELEPHONE ENCOUNTER
----- Message from Nabeel Berman RN sent at 1/20/2021 12:00 PM EST -----  Regarding: FW: Dr Esteban/telephone    ----- Message -----  From: Barrington Hawkins  Sent: 1/20/2021  10:12 AM EST  To: [facility] Nurse Pool  Subject: Dr Lona Ying is calling regarding the Diclofenac sodium 1 percent Gel, need the grams per doc to bill to insurance, please call [phone]

## 2021-01-27 ENCOUNTER — HOSPITAL ENCOUNTER (OUTPATIENT)
Dept: MRI IMAGING | Age: 51
Discharge: HOME | End: 2021-01-27
Attending: PEDIATRICS

## 2021-01-27 VITALS — WEIGHT: 270 LBS | BODY MASS INDEX: 33.75 KG/M2

## 2021-01-27 DIAGNOSIS — M47.818 SI JOINT ARTHRITIS: ICD-10-CM

## 2021-01-27 RX ORDER — GADOTERATE MEGLUMINE 376.9 MG/ML
20 INJECTION INTRAVENOUS
Status: DISCONTINUED | OUTPATIENT
Start: 2021-01-27 | End: 2021-01-28 | Stop reason: HOSPADM

## 2021-01-28 ENCOUNTER — HOSPITAL ENCOUNTER (OUTPATIENT)
Dept: MRI IMAGING | Age: 51
Discharge: HOME | End: 2021-01-28
Attending: PEDIATRICS
Payer: COMMERCIAL

## 2021-01-28 VITALS — BODY MASS INDEX: 33.75 KG/M2 | WEIGHT: 270 LBS

## 2021-01-28 PROCEDURE — A9575 INJ GADOTERATE MEGLUMI 0.1ML: HCPCS | Performed by: PEDIATRICS

## 2021-01-28 PROCEDURE — 74011250636 HC RX REV CODE- 250/636: Performed by: PEDIATRICS

## 2021-01-28 PROCEDURE — 72197 MRI PELVIS W/O & W/DYE: CPT

## 2021-01-28 RX ORDER — GADOTERATE MEGLUMINE 376.9 MG/ML
20 INJECTION INTRAVENOUS ONCE
Status: COMPLETED | OUTPATIENT
Start: 2021-01-28 | End: 2021-01-28

## 2021-01-28 RX ADMIN — GADOTERATE MEGLUMINE 15 ML: 376.9 INJECTION INTRAVENOUS at 13:34

## 2021-02-15 ENCOUNTER — VIRTUAL VISIT (OUTPATIENT)
Dept: RHEUMATOLOGY | Age: 51
End: 2021-02-15
Payer: COMMERCIAL

## 2021-02-15 DIAGNOSIS — M47.819 SPONDYLARTHRITIS: Primary | ICD-10-CM

## 2021-02-15 DIAGNOSIS — M19.09 OSTEOARTHRITIS OF OTHER SITE, UNSPECIFIED OSTEOARTHRITIS TYPE: ICD-10-CM

## 2021-02-15 PROCEDURE — 99214 OFFICE O/P EST MOD 30 MIN: CPT | Performed by: PEDIATRICS

## 2021-02-15 RX ORDER — PREDNISONE 5 MG/1
5 TABLET ORAL 2 TIMES DAILY
Qty: 60 TAB | Refills: 0 | Status: SHIPPED | OUTPATIENT
Start: 2021-02-15

## 2021-02-15 NOTE — PROGRESS NOTES
Due to the recent COVID19 outbreak, logistics of coming in to the office, this patient's appointment today was converted to a telemedicine visit - video    The patient was in their home and consented to this sort of visit. Dr. Saini Staff and his scribe were in the office during the visit. Current outbreak of COVID19- we discussed the current CDC recommendations of practicing social distancing, only going out for needed trips to the store/pharmacy and avoiding groups of 10 or more. Discussed that people with obesity, DM, heart disease and advanced age are likely at increased risk of severe disease if they were to contract the virus. We discussed the rheum-covid project and the registry data which shows that the medications we use do not put patients at higher risk for severe disease. At this time, we are not recommending stopping these medications in asymptomatic people. We discussed holding DMARDs, biologics and ADALGISA inhibitors in those that are exposed and have been diagnosed with COVID19. We reviewed the previous plan from the last visit. We also discussed the SARS-CoV-2 vaccine: I recommend all patients, including rheumatology patients, receive an approved COVID-19 vaccine if available. Below is a synopsis of what was covered during the phone/video call. RHEUMATOLOGY PROBLEM LIST AND CHIEF COMPLAINT  1. Possible Spondyloarthropathy - mildly elevated CCP (25)   2. Knee OA     INTERVAL HISTORY  Mr. Hernando Singh is a 49 y.o.  male who returns for follow up. We discussed the study results in detail. X-ray of bilateral knees reviewed showed no fracture with bilateral osteoarthritis and reactive joint effusions and a right sided Baker's cyst. X-ray of SI joints reviewed showed mild sacroiliac joint osteoarthritis. X-ray of b/l feet showed mild left first MTP and mild right first TMT joint osteoarthritis.  X-ray of lumbar/cervical/thoracic spine showed degenerative disc disease at L3-4, L4-5, from C4 through C7 with no foraminal stenosis, and probable DISH in upper lumbar/mid to lower thoracic spine region. No evidence of inflammatory arthritis shown on x-rays. MRI of pelvis reviewed showed mild bilateral sacroiliac joint osteoarthritis and bone marrow edema in the right sacrum S1 segment. The patient reports improvement of discomfort and morning stiffness while using Celebrex daily. He states topical Diclofenac provided some relief. Of note, the patient recently tested positive for Covid-19 with severe symptoms of cough and shortness of breath for about 4 days. PHYSICAL EXAM  Patient not fully examined; the patient is here to review lab studies, radiologic studies and discuss management and treatment. LABS, RADIOLOGY AND PROCEDURES - Previous available labs, radiology and procedures were reviewed in detail with the patient. The patient was counseled on the labs that were ordered and the meaning of positive and negative results and any disease implication that these labs may have. ASSESSMENT  1. Knee OA - Continue exercising as tolerated with at home PT exercises. Recommended weight loss, use of thermal agents, and pharmacologic treatment including oral and topical NSAIDs. Intraarticular hyaluronate injections are beneficial in some patients. Intraarticular steroid therapies are an option as long as no more then 3 per year are performed. Opioids are reserved for severe knee OA. 2.  Possible Spondyloarthropathy - Due to bone marrow edema and other changes seen on SI joint, I recommend the patient begin Prednisone 10 mg daily for one week. This will allow patient to evaluate for any improvement of his inflammatory symptoms. I explained that some symptoms caused by DISH can resemble those caused by Ankylosing Spondylitis. Based on his response to Prednisone we will consider starting patient on Sulfasalazine 500 mg BID. For now he should continue on Celebrex 200 mg daily. PLAN  1.  Celebrex 200 mg daily   2. Prednisone 10 mg daily for one week    3. Start Sulfasalazine based on Prednisone response   4. Follow up based on Prednisone response     Total face-to face time was 20 minutes, greater than 50% of which was spent in counseling and coordination of care. The diagnosis, treatment and various other items were discussed in detail: Test results, medication options, possible side effects, lifestyle changes. Karla Simon MD  Adult and Pediatric Rheumatology     [facility], [address] [place] [address], Phone [phone], Fax [phone]     Visiting  of Pediatrics    Department of Pediatrics, [facility] of [address] [place] [address], [address] [place] [address], Phone [phone], Fax [phone]    There are no Patient Instructions on file for this visit. cc:  Tamiko Zambrano NP    Written by chaz Jackson, as dictated by Jovanna Chatman.  Chavo Simon M.D.

## 2021-03-15 ENCOUNTER — IMMUNIZATION (OUTPATIENT)
Dept: INTERNAL MEDICINE CLINIC | Age: 51
End: 2021-03-15
Payer: COMMERCIAL

## 2021-03-15 DIAGNOSIS — Z23 ENCOUNTER FOR IMMUNIZATION: Primary | ICD-10-CM

## 2021-03-15 PROCEDURE — 91300 COVID-19, MRNA, LNP-S, PF, 30MCG/0.3ML DOSE(PFIZER): CPT | Performed by: FAMILY MEDICINE

## 2021-03-15 PROCEDURE — 0001A COVID-19, MRNA, LNP-S, PF, 30MCG/0.3ML DOSE(PFIZER): CPT | Performed by: FAMILY MEDICINE

## 2021-04-05 ENCOUNTER — IMMUNIZATION (OUTPATIENT)
Dept: INTERNAL MEDICINE CLINIC | Age: 51
End: 2021-04-05
Payer: COMMERCIAL

## 2021-04-05 DIAGNOSIS — Z23 ENCOUNTER FOR IMMUNIZATION: Primary | ICD-10-CM

## 2021-04-05 PROCEDURE — 91300 COVID-19, MRNA, LNP-S, PF, 30MCG/0.3ML DOSE(PFIZER): CPT | Performed by: FAMILY MEDICINE

## 2021-04-05 PROCEDURE — 0002A COVID-19, MRNA, LNP-S, PF, 30MCG/0.3ML DOSE(PFIZER): CPT | Performed by: FAMILY MEDICINE

## 2021-08-23 ENCOUNTER — OFFICE VISIT (OUTPATIENT)
Dept: URGENT CARE | Age: 51
End: 2021-08-23
Payer: COMMERCIAL

## 2021-08-23 VITALS — RESPIRATION RATE: 16 BRPM | OXYGEN SATURATION: 97 % | HEART RATE: 78 BPM | TEMPERATURE: 98.5 F

## 2021-08-23 DIAGNOSIS — R07.0 THROAT PAIN: ICD-10-CM

## 2021-08-23 DIAGNOSIS — Z20.822 SUSPECTED COVID-19 VIRUS INFECTION: Primary | ICD-10-CM

## 2021-08-23 LAB — SARS-COV-2 POC: NEGATIVE

## 2021-08-23 PROCEDURE — 99202 OFFICE O/P NEW SF 15 MIN: CPT | Performed by: FAMILY MEDICINE

## 2021-08-23 PROCEDURE — 87426 SARSCOV CORONAVIRUS AG IA: CPT | Performed by: FAMILY MEDICINE
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
## 2023-02-27 ENCOUNTER — OFFICE VISIT (OUTPATIENT)
Dept: FAMILY MEDICINE CLINIC | Age: 53
End: 2023-02-27
Payer: COMMERCIAL

## 2023-02-27 VITALS
SYSTOLIC BLOOD PRESSURE: 136 MMHG | HEIGHT: 75 IN | OXYGEN SATURATION: 98 % | HEART RATE: 71 BPM | RESPIRATION RATE: 18 BRPM | TEMPERATURE: 99.5 F | BODY MASS INDEX: 37.05 KG/M2 | DIASTOLIC BLOOD PRESSURE: 88 MMHG | WEIGHT: 298 LBS

## 2023-02-27 DIAGNOSIS — Z98.84 S/P GASTRIC BYPASS: ICD-10-CM

## 2023-02-27 DIAGNOSIS — Z76.89 ENCOUNTER TO ESTABLISH CARE WITH NEW DOCTOR: ICD-10-CM

## 2023-02-27 DIAGNOSIS — J02.9 SORE THROAT: ICD-10-CM

## 2023-02-27 DIAGNOSIS — Z00.00 ROUTINE GENERAL MEDICAL EXAMINATION AT HEALTH CARE FACILITY: Primary | ICD-10-CM

## 2023-02-27 DIAGNOSIS — Z11.59 NEED FOR HEPATITIS C SCREENING TEST: ICD-10-CM

## 2023-02-27 DIAGNOSIS — R42 EPISODIC LIGHTHEADEDNESS: ICD-10-CM

## 2023-02-27 DIAGNOSIS — Z13.220 SCREENING FOR LIPID DISORDERS: ICD-10-CM

## 2023-02-27 DIAGNOSIS — R73.03 PREDIABETES: ICD-10-CM

## 2023-02-27 DIAGNOSIS — Z12.5 PROSTATE CANCER SCREENING: ICD-10-CM

## 2023-02-27 LAB
S PYO AG THROAT QL: NEGATIVE
VALID INTERNAL CONTROL?: YES

## 2023-02-27 PROCEDURE — 87880 STREP A ASSAY W/OPTIC: CPT | Performed by: FAMILY MEDICINE

## 2023-02-27 PROCEDURE — 99396 PREV VISIT EST AGE 40-64: CPT | Performed by: FAMILY MEDICINE

## 2023-02-27 PROCEDURE — 99214 OFFICE O/P EST MOD 30 MIN: CPT | Performed by: FAMILY MEDICINE

## 2023-02-27 NOTE — PATIENT INSTRUCTIONS
Please call one of the offices to schedule your colonoscopy/GI needs:    [facility] [address] [address], [address] [place] [address]  [phone]    [facility]  [address] [facility]  [address], [address] [place] [address] Ave  [phone]
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
## 2023-02-28 LAB
25(OH)D3 SERPL-MCNC: 10.3 NG/ML (ref 30–100)
ALBUMIN SERPL-MCNC: 4.1 G/DL (ref 3.5–5)
ALBUMIN/GLOB SERPL: 1.2 (ref 1.1–2.2)
ALP SERPL-CCNC: 109 U/L (ref 45–117)
ALT SERPL-CCNC: 29 U/L (ref 12–78)
ANION GAP SERPL CALC-SCNC: 4 MMOL/L (ref 5–15)
AST SERPL-CCNC: 25 U/L (ref 15–37)
BASOPHILS # BLD: 0.1 K/UL (ref 0–0.1)
BASOPHILS NFR BLD: 1 % (ref 0–1)
BILIRUB SERPL-MCNC: 0.5 MG/DL (ref 0.2–1)
BUN SERPL-MCNC: 12 MG/DL (ref 6–20)
BUN/CREAT SERPL: 18 (ref 12–20)
CALCIUM SERPL-MCNC: 8.9 MG/DL (ref 8.5–10.1)
CHLORIDE SERPL-SCNC: 106 MMOL/L (ref 97–108)
CHOLEST SERPL-MCNC: 137 MG/DL
CO2 SERPL-SCNC: 27 MMOL/L (ref 21–32)
CREAT SERPL-MCNC: 0.68 MG/DL (ref 0.7–1.3)
DIFFERENTIAL METHOD BLD: ABNORMAL
EOSINOPHIL # BLD: 0.3 K/UL (ref 0–0.4)
EOSINOPHIL NFR BLD: 3 % (ref 0–7)
ERYTHROCYTE [DISTWIDTH] IN BLOOD BY AUTOMATED COUNT: 12.7 % (ref 11.5–14.5)
EST. AVERAGE GLUCOSE BLD GHB EST-MCNC: 114 MG/DL
FERRITIN SERPL-MCNC: 7 NG/ML (ref 26–388)
FOLATE SERPL-MCNC: 15.7 NG/ML (ref 5–21)
GLOBULIN SER CALC-MCNC: 3.5 G/DL (ref 2–4)
GLUCOSE SERPL-MCNC: 84 MG/DL (ref 65–100)
HBA1C MFR BLD: 5.6 % (ref 4–5.6)
HCT VFR BLD AUTO: 45.1 % (ref 36.6–50.3)
HCV AB SERPL QL IA: NONREACTIVE
HDLC SERPL-MCNC: 40 MG/DL
HDLC SERPL: 3.4 (ref 0–5)
HGB BLD-MCNC: 13.9 G/DL (ref 12.1–17)
IMM GRANULOCYTES # BLD AUTO: 0 K/UL (ref 0–0.04)
IMM GRANULOCYTES NFR BLD AUTO: 0 % (ref 0–0.5)
IRON SATN MFR SERPL: 12 % (ref 20–50)
IRON SERPL-MCNC: 62 UG/DL (ref 35–150)
LDLC SERPL CALC-MCNC: 69.4 MG/DL (ref 0–100)
LYMPHOCYTES # BLD: 1.9 K/UL (ref 0.8–3.5)
LYMPHOCYTES NFR BLD: 22 % (ref 12–49)
MCH RBC QN AUTO: 25.3 PG (ref 26–34)
MCHC RBC AUTO-ENTMCNC: 30.8 G/DL (ref 30–36.5)
MCV RBC AUTO: 82.1 FL (ref 80–99)
MONOCYTES # BLD: 0.8 K/UL (ref 0–1)
MONOCYTES NFR BLD: 10 % (ref 5–13)
NEUTS SEG # BLD: 5.7 K/UL (ref 1.8–8)
NEUTS SEG NFR BLD: 64 % (ref 32–75)
NRBC # BLD: 0 K/UL (ref 0–0.01)
NRBC BLD-RTO: 0 PER 100 WBC
PLATELET # BLD AUTO: 262 K/UL (ref 150–400)
PMV BLD AUTO: 12.5 FL (ref 8.9–12.9)
POTASSIUM SERPL-SCNC: 4.5 MMOL/L (ref 3.5–5.1)
PROT SERPL-MCNC: 7.6 G/DL (ref 6.4–8.2)
PSA SERPL-MCNC: 0.4 NG/ML (ref 0.01–4)
RBC # BLD AUTO: 5.49 M/UL (ref 4.1–5.7)
SODIUM SERPL-SCNC: 137 MMOL/L (ref 136–145)
TIBC SERPL-MCNC: 500 UG/DL (ref 250–450)
TRIGL SERPL-MCNC: 138 MG/DL (ref ?–150)
VIT B12 SERPL-MCNC: 188 PG/ML (ref 193–986)
VLDLC SERPL CALC-MCNC: 27.6 MG/DL
WBC # BLD AUTO: 8.8 K/UL (ref 4.1–11.1)

## 2023-02-28 RX ORDER — ASPIRIN 325 MG
TABLET, DELAYED RELEASE (ENTERIC COATED) ORAL
Qty: 8 CAPSULE | Refills: 0 | Status: SHIPPED | OUTPATIENT
Start: 2023-02-28

## 2023-02-28 NOTE — PROGRESS NOTES
Please call patient:    Vitamin D levels are low. I have sent in a prescription for high dose vitamin D3 at 50,000 units once a week for 8 weeks. After 8 weeks, switch to a lower dose of over-the-counter vitamin D3 2000 units every day. B12 is also low. Would increase daily dose to B12 2000 mcg daily. Iron levels appear low but he is not anemic; would recommend increasing iron rich foods in his diet. Other labs are normal including sugar, prostate, cholesterol, and hepatitis C screen.

## 2023-03-01 NOTE — PROGRESS NOTES
Attempted to call patient. Left voicemail to call the office back at [phone]. Results visible to patient. [employer] message sent with provider note.

## 2023-03-08 ENCOUNTER — OFFICE VISIT (OUTPATIENT)
Dept: URGENT CARE | Age: 53
End: 2023-03-08
Payer: COMMERCIAL

## 2023-03-08 VITALS
TEMPERATURE: 98.1 F | SYSTOLIC BLOOD PRESSURE: 132 MMHG | HEART RATE: 64 BPM | RESPIRATION RATE: 16 BRPM | BODY MASS INDEX: 37.25 KG/M2 | WEIGHT: 298 LBS | OXYGEN SATURATION: 99 % | DIASTOLIC BLOOD PRESSURE: 82 MMHG

## 2023-03-08 DIAGNOSIS — J40 BRONCHITIS: Primary | ICD-10-CM

## 2023-03-08 PROCEDURE — S9083 URGENT CARE CENTER GLOBAL: HCPCS | Performed by: NURSE PRACTITIONER

## 2023-03-08 RX ORDER — DOXYCYCLINE 100 MG/1
100 CAPSULE ORAL 2 TIMES DAILY
Qty: 14 CAPSULE | Refills: 0 | Status: SHIPPED | OUTPATIENT
Start: 2023-03-08 | End: 2023-03-15

## 2023-03-08 RX ORDER — PREDNISONE 10 MG/1
10 TABLET ORAL SEE ADMIN INSTRUCTIONS
Qty: 21 TABLET | Refills: 0 | Status: SHIPPED | OUTPATIENT
Start: 2023-03-08

## 2023-03-08 NOTE — PROGRESS NOTES
HPI     Pt presents with complaints of cough, chest tightness, mild SOB with exertion for > 1 week. Cough is worse when laying down at night. May have some slight wheezing. Denies fevers, chills, CP, sinus congestion. Started > 1 week ago, PCP thought may be allergies. Traveled to [place], got severe cough. Covid test negative. Just returned from [place]. Hx walking pna and this feels similar.     Past Medical History:   Diagnosis Date    ACL tear 1997    Arthritis     Prediabetes     S/P gastric bypass 04/05/2013    Tear of MCL (medial collateral ligament) of knee 1997        Past Surgical History:   Procedure Laterality Date    HX ACL RECONSTRUCTION Left 1997    HX ACL RECONSTRUCTION Left 05/2013    HX ADENOIDECTOMY  1978    HX GASTRIC BYPASS  2007    HX KNEE ARTHROSCOPY Left 2014    HX TONSILLECTOMY  1990         Family History   Problem Relation Age of Onset    Obesity Mother     Heart Disease Mother     Diabetes Father     Obesity Sister         Social History     Socioeconomic History    Marital status:      Spouse name: Not on file    Number of children: Not on file    Years of education: Not on file    Highest education level: Not on file   Occupational History    Occupation: Sales   Tobacco Use    Smoking status: Never    Smokeless tobacco: Never   Substance and Sexual Activity    Alcohol use: Yes     Comment: rarely    Drug use: No    Sexual activity: Yes     Partners: Female   Other Topics Concern    Not on file   Social History Narrative    Not on file     Social Determinants of Health     Financial Resource Strain: Not on file   Food Insecurity: Not on file   Transportation Needs: Not on file   Physical Activity: Not on file   Stress: Not on file   Social Connections: Not on file   Intimate Partner Violence: Not on file   Housing Stability: Not on file                ALLERGIES: Nsaids (non-steroidal anti-inflammatory drug) and Pcn [penicillins]    Review of Systems   Constitutional: Negative for chills and fever. HENT:  Negative for congestion, ear pain, sinus pressure, sinus pain and sore throat. Respiratory:  Positive for cough, chest tightness, shortness of breath and wheezing. Cardiovascular:  Negative for chest pain. Gastrointestinal:  Negative for abdominal pain, diarrhea, nausea and vomiting. Vitals:    03/08/23 1231 03/08/23 1233   BP: (!) 145/71 132/82   Pulse: 64    Resp: 16    Temp: 98.1 °F (36.7 °C)    SpO2: 99%    Weight: 298 lb (135.2 kg)        Physical Exam  Constitutional:       General: He is not in acute distress. Appearance: Normal appearance. He is obese. He is not ill-appearing or toxic-appearing. HENT:      Head: Normocephalic and atraumatic. Right Ear: Tympanic membrane, ear canal and external ear normal.      Left Ear: Tympanic membrane, ear canal and external ear normal.      Nose: Nose normal.      Mouth/Throat:      Mouth: Mucous membranes are moist.      Pharynx: Oropharynx is clear. Eyes:      Extraocular Movements: Extraocular movements intact. Conjunctiva/sclera: Conjunctivae normal.      Pupils: Pupils are equal, round, and reactive to light. Cardiovascular:      Rate and Rhythm: Normal rate and regular rhythm. Pulmonary:      Effort: Pulmonary effort is normal.      Breath sounds: Normal breath sounds. Musculoskeletal:      Cervical back: Normal range of motion and neck supple. Lymphadenopathy:      Cervical: No cervical adenopathy. Skin:     General: Skin is warm and dry. Neurological:      Mental Status: He is alert. XR Results (most recent):  Results from Appointment encounter on 03/08/23    XR CHEST PA LAT    Narrative  Clinical history: Cough  INDICATION:   Cough  COMPARISON: 2007      FINDINGS:  PA and lateral views of the chest are obtained. The cardiopericardial silhouette is within normal limits. There is no pleural  effusion, pneumothorax or focal consolidation present.     Impression  No acute intrathoracic disease. ICD-10-CM ICD-9-CM   1. Bronchitis  J40 490       Orders Placed This Encounter    XR CHEST PA LAT     Standing Status:   Future     Number of Occurrences:   1     Standing Expiration Date:   4/8/2024    doxycycline (MONODOX) 100 mg capsule     Sig: Take 1 Capsule by mouth two (2) times a day for 7 days. Dispense:  14 Capsule     Refill:  0    predniSONE (STERAPRED DS) 10 mg dose pack     Sig: Take 1 Tablet by mouth See Admin Instructions. See administration instruction per 10mg dose pack     Dispense:  21 Tablet     Refill:  0      Mucinex, fluids. The patient is to follow up with PCP [facility]. If signs and symptoms become worse the pt is to go to the ER.         Nilda Malin NP      MDM    Procedures

## 2023-03-22 ENCOUNTER — OFFICE VISIT (OUTPATIENT)
Dept: CARDIOLOGY CLINIC | Age: 53
End: 2023-03-22

## 2023-03-22 VITALS
BODY MASS INDEX: 36.93 KG/M2 | SYSTOLIC BLOOD PRESSURE: 138 MMHG | HEIGHT: 75 IN | RESPIRATION RATE: 18 BRPM | OXYGEN SATURATION: 97 % | WEIGHT: 297 LBS | DIASTOLIC BLOOD PRESSURE: 80 MMHG | HEART RATE: 88 BPM

## 2023-03-22 DIAGNOSIS — R42 LIGHT HEADEDNESS: Primary | ICD-10-CM

## 2023-03-22 DIAGNOSIS — R06.02 SHORT OF BREATH ON EXERTION: ICD-10-CM

## 2023-03-22 DIAGNOSIS — Z98.890 HISTORY OF GASTRIC SURGERY: ICD-10-CM

## 2023-03-22 DIAGNOSIS — Z82.49 FAMILY HISTORY OF CARDIAC DISORDER: ICD-10-CM

## 2023-03-22 NOTE — LETTER
Patient:  Isabella Herrera   YOB: 1970  Date of Visit: 3/22/2023      Dear Pretty Mclaughlin MD  [address] [facility] [address]  Via In Basket: Thank you for referring Mr. Ita Jones to me for evaluation/treatment. Below are the relevant portions of my assessment and plan of care. Mr. Neva Selby is a 48 yo M with a history of gastric bypass, prediabetes prior to that, multiple knee surgeries, family history with his dad having a valve replacement early in life, referred by his primary care physician for cardiac evaluation. He has been having issues with lightheadedness that is exertional over the last few months. He will be 20-30 minutes into his exercise routine when he does exercise regularly, about an hour at a time. When the lightheadedness happens, it usually lasts a few minutes. He does get occasional exertional shortness of breath, but overall that has been okay. He denies any exertional chest pain. No prior cardiac history. He notes when he was in his early 25s, he had some issues with lightheadedness. He does admit to significant inactivity and weight gain compared to before when he was an avid marathon runner. Due to issues with his left knee that has restricted him. He is compensated on exam with clear lungs and no lower extremity edema. His EKG was sinus bradycardia, heart rate of 59 and nonspecific ST-T wave abnormality. Soc hx. No tobacco.   Fam hx. Dad valve replacement in 46s. Assessment and Plan:   1. Unstable angina. His symptoms are exertional in nature, including dizziness and some shortness of breath and will proceed with an echocardiogram and treadmill stress test for further evaluation. 2. Family history of father with valve replacement early in life. 3. Gastric bypass several years ago. 4. Prediabetes prior to his gastric bypass, but none since. If you have questions, please do not hesitate to call me. Sincerely,      Jade Kelley MD

## 2023-03-22 NOTE — PATIENT INSTRUCTIONS
You will be scheduled for an echocardiogram and exercise treadmill stress test after your appointment today. Please wear comfortable clothing (shorts or pants with a shirt or blouse) and walking/athletic shoes. Do not eat or drink anything, except water, for at least 2 hours prior to your test.    Do take your scheduled medications prior to your test.    Our office will call you with results.

## 2023-03-22 NOTE — PROGRESS NOTES
[facility] [place]:   [phone]    History of Present Illness:  Mr. Caity Pardo is a 48 yo M with a history of gastric bypass, prediabetes prior to that, multiple knee surgeries, family history with his dad having a valve replacement early in life, referred by his primary care physician for cardiac evaluation. He has been having issues with lightheadedness that is exertional over the last few months. He will be 20-30 minutes into his exercise routine when he does exercise regularly, about an hour at a time. When the lightheadedness happens, it usually lasts a few minutes. He does get occasional exertional shortness of breath, but overall that has been okay. He denies any exertional chest pain. No prior cardiac history. He notes when he was in his early 25s, he had some issues with lightheadedness. He does admit to significant inactivity and weight gain compared to before when he was an avid marathon runner. Due to issues with his left knee that has restricted him. He is compensated on exam with clear lungs and no lower extremity edema. His EKG was sinus bradycardia, heart rate of 59 and nonspecific ST-T wave abnormality. Soc hx. No tobacco.   Fam hx. Dad valve replacement in 46s. Assessment and Plan:   1. Unstable angina. His symptoms are exertional in nature, including dizziness and some shortness of breath and will proceed with an echocardiogram and treadmill stress test for further evaluation. 2. Family history of father with valve replacement early in life. 3. Gastric bypass several years ago. 4. Prediabetes prior to his gastric bypass, but none since. He  has a past medical history of ACL tear (1997), Arthritis, Prediabetes, S/P gastric bypass (04/05/2013), and Tear of MCL (medial collateral ligament) of knee (1997). All other systems negative except as above.      PE  Vitals:    03/22/23 1315   BP: 138/80   Pulse: 88   Resp: 18   SpO2: 97%   Weight: 297 lb (134.7 kg)   Height: 6' 3\" (1.905 m)    Body mass index is 37.12 kg/m².   General appearance - alert, well appearing, and in no distress  Mental status - affect appropriate to mood  Eyes - sclera anicteric, moist mucous membranes  Neck - supple, no JVD  Chest - clear to auscultation, no wheezes, rales or rhonchi  Heart - normal rate, regular rhythm, normal S1, S2, no murmurs, rubs, clicks or gallops  Abdomen - soft, nontender, nondistended, no masses or organomegaly  Back exam - full range of motion, no tenderness  Neurological - no focal deficits  Extremities - peripheral pulses normal, no pedal edema      Recent Labs:  Lab Results   Component Value Date/Time    Cholesterol, total 137 02/27/2023 03:02 PM    HDL Cholesterol 40 02/27/2023 03:02 PM    LDL, calculated 69.4 02/27/2023 03:02 PM    Triglyceride 138 02/27/2023 03:02 PM    CHOL/HDL Ratio 3.4 02/27/2023 03:02 PM     Lab Results   Component Value Date/Time    Creatinine 0.68 (L) 02/27/2023 03:02 PM     Lab Results   Component Value Date/Time    BUN 12 02/27/2023 03:02 PM     Lab Results   Component Value Date/Time    Potassium 4.5 02/27/2023 03:02 PM     Lab Results   Component Value Date/Time    Hemoglobin A1c 5.6 02/27/2023 03:02 PM     Lab Results   Component Value Date/Time    HGB 13.9 02/27/2023 03:02 PM     Lab Results   Component Value Date/Time    PLATELET 869 21/41/9900 03:02 PM       Reviewed:  Past Medical History:   Diagnosis Date    ACL tear 1997    Arthritis     Prediabetes     S/P gastric bypass 04/05/2013    Tear of MCL (medial collateral ligament) of knee 1997     Social History     Tobacco Use   Smoking Status Never   Smokeless Tobacco Never     Social History     Substance and Sexual Activity   Alcohol Use Yes    Comment: weekends     Allergies   Allergen Reactions    Nsaids (Non-Steroidal Anti-Inflammatory Drug) Other (comments)     H/O Gastric bypass    Pcn [Penicillins] Hives       Current Outpatient Medications   Medication Sig cholecalciferol (VITAMIN D3) (50,000 UNITS /1250 MCG) capsule Take 1 capsule once a week for the next 8 weeks then switch to OTC vitamin D3 2000 units daily    cyanocobalamin, vitamin B-12, 1,500 mcg TbDL Take  by mouth.    predniSONE (STERAPRED DS) 10 mg dose pack Take 1 Tablet by mouth See Admin Instructions. See administration instruction per 10mg dose pack (Patient not taking: Reported on 3/22/2023)     No current facility-administered medications for this visit.        Indra Agee MD  [facility] [place]  [address] [facility] [address]  [address]

## 2023-04-17 ENCOUNTER — ANCILLARY PROCEDURE (OUTPATIENT)
Dept: CARDIOLOGY CLINIC | Age: 53
End: 2023-04-17
Payer: COMMERCIAL

## 2023-04-17 VITALS — BODY MASS INDEX: 36.93 KG/M2 | WEIGHT: 297 LBS | HEIGHT: 75 IN

## 2023-04-17 PROCEDURE — 93015 CV STRESS TEST SUPVJ I&R: CPT | Performed by: INTERNAL MEDICINE

## 2023-04-18 ENCOUNTER — PATIENT MESSAGE (OUTPATIENT)
Dept: CARDIOLOGY CLINIC | Age: 53
End: 2023-04-18

## 2023-04-18 NOTE — TELEPHONE ENCOUNTER
MD Charito Sena, BOZENA  Cc: Jina Alberto RN  Please let pt know stress test was normal. thx     Sent Harir message.

## 2023-04-20 ENCOUNTER — TELEPHONE (OUTPATIENT)
Dept: CARDIOLOGY CLINIC | Age: 53
End: 2023-04-20

## 2023-04-20 NOTE — TELEPHONE ENCOUNTER
----- Message from Arelis Ndiaye MD sent at 4/17/2023 11:34 PM EDT -----  Please let pt know stress test was normal. thx

## 2023-04-20 NOTE — TELEPHONE ENCOUNTER
Identifiers x 2. Informed of results. Verbalized understanding. Confirmed upcoming echo appt.      Future Appointments   Date Time Provider Rossana Nelly   4/27/2023  3:45 PM ARIANE GOLDMAN AMB

## 2023-04-27 ENCOUNTER — ANCILLARY PROCEDURE (OUTPATIENT)
Dept: CARDIOLOGY CLINIC | Age: 53
End: 2023-04-27

## 2023-04-27 VITALS — WEIGHT: 297 LBS | BODY MASS INDEX: 36.93 KG/M2 | HEIGHT: 75 IN

## 2023-04-27 DIAGNOSIS — Z98.890 HISTORY OF GASTRIC SURGERY: ICD-10-CM

## 2023-04-27 DIAGNOSIS — R06.02 SHORT OF BREATH ON EXERTION: ICD-10-CM

## 2023-04-27 DIAGNOSIS — Z82.49 FAMILY HISTORY OF CARDIAC DISORDER: ICD-10-CM

## 2023-04-27 DIAGNOSIS — R42 LIGHT HEADEDNESS: ICD-10-CM

## 2023-04-28 LAB
ECHO AO ASC DIAM: 3.7 CM
ECHO AO ASCENDING AORTA INDEX: 1.42 CM/M2
ECHO AO ROOT DIAM: 3.2 CM
ECHO AO ROOT INDEX: 1.23 CM/M2
ECHO AV AREA PEAK VELOCITY: 3.2 CM2
ECHO AV AREA VTI: 3.8 CM2
ECHO AV AREA/BSA PEAK VELOCITY: 1.2 CM2/M2
ECHO AV AREA/BSA VTI: 1.5 CM2/M2
ECHO AV MEAN GRADIENT: 3 MMHG
ECHO AV MEAN VELOCITY: 0.8 M/S
ECHO AV PEAK GRADIENT: 7 MMHG
ECHO AV PEAK VELOCITY: 1.3 M/S
ECHO AV VELOCITY RATIO: 0.77
ECHO AV VTI: 22.2 CM
ECHO EST RA PRESSURE: 3 MMHG
ECHO LA DIAMETER INDEX: 1.62 CM/M2
ECHO LA DIAMETER: 4.2 CM
ECHO LA TO AORTIC ROOT RATIO: 1.31
ECHO LA VOL 2C: 57 ML (ref 18–58)
ECHO LA VOL 4C: 64 ML (ref 18–58)
ECHO LA VOL BP: 61 ML (ref 18–58)
ECHO LA VOL/BSA BIPLANE: 23 ML/M2 (ref 16–34)
ECHO LA VOLUME AREA LENGTH: 66 ML
ECHO LA VOLUME INDEX A2C: 22 ML/M2 (ref 16–34)
ECHO LA VOLUME INDEX A4C: 25 ML/M2 (ref 16–34)
ECHO LA VOLUME INDEX AREA LENGTH: 25 ML/M2 (ref 16–34)
ECHO LV E' LATERAL VELOCITY: 8 CM/S
ECHO LV E' SEPTAL VELOCITY: 6 CM/S
ECHO LV EDV A4C: 137 ML
ECHO LV EDV INDEX A4C: 53 ML/M2
ECHO LV EJECTION FRACTION A4C: 59 %
ECHO LV ESV A4C: 57 ML
ECHO LV ESV INDEX A4C: 22 ML/M2
ECHO LV FRACTIONAL SHORTENING: 32 % (ref 28–44)
ECHO LV INTERNAL DIMENSION DIASTOLE INDEX: 2.04 CM/M2
ECHO LV INTERNAL DIMENSION DIASTOLIC: 5.3 CM (ref 4.2–5.9)
ECHO LV INTERNAL DIMENSION SYSTOLIC INDEX: 1.38 CM/M2
ECHO LV INTERNAL DIMENSION SYSTOLIC: 3.6 CM
ECHO LV IVSD: 1 CM (ref 0.6–1)
ECHO LV MASS 2D: 213.9 G (ref 88–224)
ECHO LV MASS INDEX 2D: 82.3 G/M2 (ref 49–115)
ECHO LV POSTERIOR WALL DIASTOLIC: 1.1 CM (ref 0.6–1)
ECHO LV RELATIVE WALL THICKNESS RATIO: 0.42
ECHO LVOT AREA: 4.2 CM2
ECHO LVOT AV VTI INDEX: 0.93
ECHO LVOT DIAM: 2.3 CM
ECHO LVOT MEAN GRADIENT: 2 MMHG
ECHO LVOT PEAK GRADIENT: 4 MMHG
ECHO LVOT PEAK VELOCITY: 1 M/S
ECHO LVOT STROKE VOLUME INDEX: 33.1 ML/M2
ECHO LVOT SV: 86 ML
ECHO LVOT VTI: 20.7 CM
ECHO MV A VELOCITY: 0.58 M/S
ECHO MV E DECELERATION TIME (DT): 318.8 MS
ECHO MV E VELOCITY: 0.56 M/S
ECHO MV E/A RATIO: 0.97
ECHO MV E/E' LATERAL: 7
ECHO MV E/E' RATIO (AVERAGED): 8.17
ECHO MV E/E' SEPTAL: 9.33
ECHO RIGHT VENTRICULAR SYSTOLIC PRESSURE (RVSP): 33 MMHG
ECHO RV FREE WALL PEAK S': 17 CM/S
ECHO RV TAPSE: 2.4 CM (ref 1.7–?)
ECHO TV REGURGITANT MAX VELOCITY: 2.73 M/S
ECHO TV REGURGITANT PEAK GRADIENT: 30 MMHG

## 2023-11-22 ENCOUNTER — OFFICE VISIT (OUTPATIENT)
Age: 53
End: 2023-11-22

## 2023-11-22 VITALS
DIASTOLIC BLOOD PRESSURE: 88 MMHG | WEIGHT: 287 LBS | TEMPERATURE: 99 F | OXYGEN SATURATION: 95 % | HEART RATE: 73 BPM | BODY MASS INDEX: 35.87 KG/M2 | SYSTOLIC BLOOD PRESSURE: 130 MMHG

## 2023-11-22 DIAGNOSIS — R68.89 FLU-LIKE SYMPTOMS: Primary | ICD-10-CM

## 2023-11-22 DIAGNOSIS — J06.9 ACUTE UPPER RESPIRATORY INFECTION: ICD-10-CM

## 2023-11-22 LAB
INFLUENZA A ANTIGEN, POC: NEGATIVE
INFLUENZA B ANTIGEN, POC: NEGATIVE
Lab: NORMAL
PERFORMING INSTRUMENT: NORMAL
QC PASS/FAIL: NORMAL
SARS-COV-2, POC: NORMAL
VALID INTERNAL CONTROL, POC: YES

## 2023-11-22 RX ORDER — METHYLPREDNISOLONE 4 MG/1
TABLET ORAL
Qty: 1 KIT | Refills: 0 | Status: SHIPPED | OUTPATIENT
Start: 2023-11-22

## 2023-11-22 NOTE — PROGRESS NOTES
Rommel Tiuts (:  1970) is a 47 y.o. male,Established patient, here for evaluation of the following chief complaint(s):  Congestion (Congestion and fever, cough and headache x 2 days and diarreha)      ASSESSMENT/PLAN:  Visit Diagnoses and Associated Orders       Flu-like symptoms    -  Primary    AMB POC INFLUENZA A  AND B REAL-TIME RT-PCR [88856 CPT(R)]      POCT COVID-19, Antigen [MRD306 Custom]      methylPREDNISolone (MEDROL, ROCÍO,) 4 MG tablet [4991]           Acute upper respiratory infection        methylPREDNISolone (MEDROL, ROCÍO,) 4 MG tablet [4991]                 Results for orders placed or performed in visit on 23   AMB POC INFLUENZA A  AND B REAL-TIME RT-PCR   Result Value Ref Range    Valid Internal Control, POC yes     Influenza A Antigen, POC Negative Negative    Influenza B Antigen, POC Negative Negative      Results for orders placed or performed in visit on 23   AMB POC INFLUENZA A  AND B REAL-TIME RT-PCR   Result Value Ref Range    Valid Internal Control, POC yes     Influenza A Antigen, POC Negative Negative    Influenza B Antigen, POC Negative Negative   POCT COVID-19, Antigen   Result Value Ref Range    SARS-COV-2, POC Not-Detected Not Detected    Lot Number [id number]     QC Pass/Fail PASS     Performing Instrument BinaxNOW         Thank you for choosing [facility] ! Warm salt water gargles for sore throat. Use throat lozenges such as Cepacol or Chloraseptic spray for throat discomfort. Avoid citrus foods as these may irritate throat discomfort. Increase fluid intake to maintain hydration  OTC Mucinex if you start to experience cough to loosen secretions. Zinc 100 mg 2 times per day for 10 days. Vitamin C 500 mg 3 times oer day for 10 days. Vitamin D2 2000 IU daily for 10 days. Elderberry daily. Humidifier. Inhale hot steam from a shower or bath. OTC Flonase as needed. COVID test and Influenza test negative.     Prescription for Medrol dose pack sent to

## 2023-11-22 NOTE — PATIENT INSTRUCTIONS
Thank you for choosing [facility] ! Warm salt water gargles for sore throat. Use throat lozenges such as Cepacol or Chloraseptic spray for throat discomfort. Avoid citrus foods as these may irritate throat discomfort. Increase fluid intake to maintain hydration  OTC Mucinex if you start to experience cough to loosen secretions. Zinc 100 mg 2 times per day for 10 days. Vitamin C 500 mg 3 times oer day for 10 days. Vitamin D2 2000 IU daily for 10 days. Elderberry daily. Humidifier. Inhale hot steam from a shower or bath. OTC Flonase as needed. COVID test and Influenza test negative. Prescription for Medrol dose pack sent to your pharmacy. Follow up with your PCP if symptoms persist, worsen, you starting running a fever of 100.6 degrees F that is not relieved by Tylenol,  If you start to experience chest pain and/or shortness of breath, seek emergency care or call 9-1-1.

## 2023-11-28 ENCOUNTER — NURSE TRIAGE (OUTPATIENT)
Dept: OTHER | Facility: CLINIC | Age: 53
End: 2023-11-28

## 2023-11-28 NOTE — TELEPHONE ENCOUNTER
[place] [facility]    Received call from [facility] [employer] with [employer]. Subjective: Caller states \"Worsening fatigue since [facility] visit\"     Current Symptoms: In [facility] 6 days ago - Covid and flu negative  Still having fevers around 101  More congestion than last week  Tried to have a VV yesterday but something happened where he was not able to connect with a provider    Onset: 8 days ago    Pain Severity: 0/10    Temperature: See above     What has been tried: Steroids    History related to the current reason for call: Problem list reviewed and no current problems related to this reason for call    Recommended disposition: See in Office Today    Care advice provided, patient verbalizes understanding; denies any other questions or concerns; instructed to call back for any new or worsening symptoms. Patient/Caller agrees with recommended disposition; writer provided warm transfer to [facility] [employer] for appointment scheduling     Attention Provider: Thank you for allowing me to participate in the care of your patient. The patient was connected to triage in response to information provided to the ECC/PSC. Please do not respond through this encounter as the response is not directed to a shared pool.     Reason for Disposition   Patient wants to be seen    Protocols used: Fever-ADULT-OH

## 2023-11-29 ENCOUNTER — TELEMEDICINE (OUTPATIENT)
Age: 53
End: 2023-11-29
Payer: COMMERCIAL

## 2023-11-29 DIAGNOSIS — R79.89 LOW VITAMIN D LEVEL: ICD-10-CM

## 2023-11-29 DIAGNOSIS — E61.1 LOW IRON: ICD-10-CM

## 2023-11-29 DIAGNOSIS — J40 BRONCHITIS: Primary | ICD-10-CM

## 2023-11-29 DIAGNOSIS — E53.8 LOW SERUM VITAMIN B12: ICD-10-CM

## 2023-11-29 PROCEDURE — 3017F COLORECTAL CA SCREEN DOC REV: CPT | Performed by: STUDENT IN AN ORGANIZED HEALTH CARE EDUCATION/TRAINING PROGRAM

## 2023-11-29 PROCEDURE — G8428 CUR MEDS NOT DOCUMENT: HCPCS | Performed by: STUDENT IN AN ORGANIZED HEALTH CARE EDUCATION/TRAINING PROGRAM

## 2023-11-29 PROCEDURE — 99214 OFFICE O/P EST MOD 30 MIN: CPT | Performed by: STUDENT IN AN ORGANIZED HEALTH CARE EDUCATION/TRAINING PROGRAM

## 2023-11-29 RX ORDER — DOXYCYCLINE HYCLATE 100 MG
100 TABLET ORAL 2 TIMES DAILY
Qty: 14 TABLET | Refills: 0 | Status: SHIPPED | OUTPATIENT
Start: 2023-11-29 | End: 2023-12-06

## 2023-11-29 NOTE — PROGRESS NOTES
Kaci Rod  47 y.o. male  1970  [address]  [id number]   [facility]  Telemedicine Progress Note  Inder Wade       Encounter Date and Time: November 29, 2023 at 1:22 PM    Consent:  He and/or the health care decision maker is aware that that he may receive a bill for this telephone service, depending on his insurance coverage, and has provided verbal consent to proceed: YES    Chief Complaint   Patient presents with    Fatigue     History of Present Illness   Kaci Rod is a 47 y.o. male was evaluated by synchronous (real-time) audio-video technology from home, through the [email or web address] Patient Portal.    8 days productive cough,   Went to Urgent care, COVID and flu negative  Took steroid and mucinex   Fever and chills     Review of Systems   See HPI    Vitals/Objective:     General: alert, cooperative, and no distress   Mental  status: mental status: alert, oriented to person, place, and time, normal mood, behavior, speech, dress, motor activity, and thought processes   Resp:  NAD   Neuro:  No focal deficits   Skin: skin: no discoloration or lesions of concern on visible areas   Due to this being a TeleHealth evaluation, many elements of the physical examination are unable to be assessed. Assessment and Plan:         Simin Ulrich was seen today for fatigue. Diagnoses and all orders for this visit:    Bronchitis  -     doxycycline hyclate (VIBRA-TABS) 100 MG tablet; Take 1 tablet by mouth 2 times daily for 7 days    Low iron    Low vitamin D level    Low serum vitamin B12              We discussed the expected course, resolution and complications of the diagnosis(es) in detail. Medication risks, benefits, costs, interactions, and alternatives were discussed as indicated. I advised him to contact the office if his condition worsens, changes or fails to improve as anticipated. He expressed understanding with the diagnosis(es) and plan.  Patient understands

## 2023-12-06 ENCOUNTER — TELEPHONE (OUTPATIENT)
Age: 53
End: 2023-12-06

## 2023-12-06 NOTE — TELEPHONE ENCOUNTER
Pt's wife Riccardo Whatley was calling to follow up with VV done on 11/29/2023 with Dr. Sarajane Lombard says pt is still experiencing symptoms, and would like to know if he should come into office to be reevaluated or can be prescribed something else that can probably help with symptoms.  Best call back number [phone]

## 2023-12-07 NOTE — TELEPHONE ENCOUNTER
Pt's wife Keyona called. Informed her that pt needs to f/u with PCP per nurse's note. Attempted to schedule appt, offered first available VV  1/9 and OV 1/11. Caller states he can't wait that long, the coughing is coming back and he has been dealing with it for almost  a month and I need to know what to do. \"    BCB# [phone]

## 2023-12-08 NOTE — TELEPHONE ENCOUNTER
Called Patient. Spoke with wife. Name and  confirmed. Scheduled appt on 23 at 2 PM. Verbalized understanding.

## 2023-12-11 ENCOUNTER — OFFICE VISIT (OUTPATIENT)
Age: 53
End: 2023-12-11
Payer: COMMERCIAL

## 2023-12-11 VITALS
DIASTOLIC BLOOD PRESSURE: 74 MMHG | WEIGHT: 291.2 LBS | HEART RATE: 68 BPM | BODY MASS INDEX: 36.21 KG/M2 | SYSTOLIC BLOOD PRESSURE: 118 MMHG | HEIGHT: 75 IN | OXYGEN SATURATION: 97 % | RESPIRATION RATE: 14 BRPM | TEMPERATURE: 97.3 F

## 2023-12-11 DIAGNOSIS — R05.2 SUBACUTE COUGH: Primary | ICD-10-CM

## 2023-12-11 PROCEDURE — G8427 DOCREV CUR MEDS BY ELIG CLIN: HCPCS | Performed by: FAMILY MEDICINE

## 2023-12-11 PROCEDURE — 1036F TOBACCO NON-USER: CPT | Performed by: FAMILY MEDICINE

## 2023-12-11 PROCEDURE — 3017F COLORECTAL CA SCREEN DOC REV: CPT | Performed by: FAMILY MEDICINE

## 2023-12-11 PROCEDURE — G8417 CALC BMI ABV UP PARAM F/U: HCPCS | Performed by: FAMILY MEDICINE

## 2023-12-11 PROCEDURE — G8484 FLU IMMUNIZE NO ADMIN: HCPCS | Performed by: FAMILY MEDICINE

## 2023-12-11 PROCEDURE — 99213 OFFICE O/P EST LOW 20 MIN: CPT | Performed by: FAMILY MEDICINE

## 2023-12-11 SDOH — ECONOMIC STABILITY: INCOME INSECURITY: HOW HARD IS IT FOR YOU TO PAY FOR THE VERY BASICS LIKE FOOD, HOUSING, MEDICAL CARE, AND HEATING?: NOT HARD AT ALL

## 2023-12-11 SDOH — ECONOMIC STABILITY: FOOD INSECURITY: WITHIN THE PAST 12 MONTHS, THE FOOD YOU BOUGHT JUST DIDN'T LAST AND YOU DIDN'T HAVE MONEY TO GET MORE.: NEVER TRUE

## 2023-12-11 SDOH — ECONOMIC STABILITY: HOUSING INSECURITY
IN THE LAST 12 MONTHS, WAS THERE A TIME WHEN YOU DID NOT HAVE A STEADY PLACE TO SLEEP OR SLEPT IN A SHELTER (INCLUDING NOW)?: NO

## 2023-12-11 SDOH — ECONOMIC STABILITY: FOOD INSECURITY: WITHIN THE PAST 12 MONTHS, YOU WORRIED THAT YOUR FOOD WOULD RUN OUT BEFORE YOU GOT MONEY TO BUY MORE.: NEVER TRUE

## 2023-12-11 ASSESSMENT — ENCOUNTER SYMPTOMS
NAUSEA: 0
CHEST TIGHTNESS: 0
CONSTIPATION: 0
SHORTNESS OF BREATH: 0
WHEEZING: 0
VOMITING: 0
DIARRHEA: 0
COUGH: 1
ABDOMINAL PAIN: 0

## 2023-12-11 NOTE — PROGRESS NOTES
Patient Name: Maribell Lazcano   MRN: [id number]    Chloe Velasquez is a 47 y.o. male who presents with the following:     Reports 3-week history of persistent cough. Initially started with flu-like symptoms and URI symptoms. Went to urgent care and tested negative for flu and COVID. He was given Medrol Dosepak which did not help. Subsequently had a virtual visit in which he got doxycycline which did help. Continues to have persistent cough and postnasal drip. Denies fevers, shortness of breath, wheezing, chest pain, history of asthma or tobacco use. Review of Systems   Constitutional:  Negative for activity change, appetite change, fatigue, fever and unexpected weight change. Respiratory:  Positive for cough. Negative for chest tightness, shortness of breath and wheezing. Cardiovascular:  Negative for chest pain, palpitations and leg swelling. Gastrointestinal:  Negative for abdominal pain, constipation, diarrhea, nausea and vomiting. Genitourinary:  Negative for dysuria, frequency and urgency. Skin:  Negative for rash. Neurological:  Negative for dizziness, weakness and headaches. Psychiatric/Behavioral:  Negative for dysphoric mood and suicidal ideas. The patient is not nervous/anxious. All other systems reviewed and are negative. The patient's medications, allergies, past medical history, surgical history, family history and social history were reviewed and updated where appropriate. Current Outpatient Medications on File Prior to Visit   Medication Sig Dispense Refill    vitamin D (CHOLECALCIFEROL) 44388 UNIT CAPS Take 1 capsule once a week for the next 8 weeks then switch to OTC vitamin D3 2000 units daily      Cyanocobalamin 1500 MCG TBDP Take by mouth      methylPREDNISolone (MEDROL, ROCÍO,) 4 MG tablet Take as directed. (Patient not taking: Reported on 12/11/2023) 1 kit 0     No current facility-administered medications on file prior to visit.        Allergies

## 2025-05-05 ENCOUNTER — OFFICE VISIT (OUTPATIENT)
Age: 55
End: 2025-05-05

## 2025-05-05 VITALS
HEART RATE: 54 BPM | TEMPERATURE: 98 F | BODY MASS INDEX: 39.17 KG/M2 | WEIGHT: 315 LBS | SYSTOLIC BLOOD PRESSURE: 131 MMHG | DIASTOLIC BLOOD PRESSURE: 88 MMHG | OXYGEN SATURATION: 97 % | HEIGHT: 75 IN

## 2025-05-05 DIAGNOSIS — H00.014 HORDEOLUM EXTERNUM OF LEFT UPPER EYELID: ICD-10-CM

## 2025-05-05 DIAGNOSIS — H10.32 ACUTE BACTERIAL CONJUNCTIVITIS OF LEFT EYE: Primary | ICD-10-CM

## 2025-05-05 RX ORDER — ERYTHROMYCIN 5 MG/G
OINTMENT OPHTHALMIC
Qty: 1 G | Refills: 0 | Status: SHIPPED | OUTPATIENT
Start: 2025-05-05 | End: 2025-05-15

## 2025-05-05 ASSESSMENT — VISUAL ACUITY
OD_CC: 20/20
OS_CC: 20/20
OU: 1

## 2025-05-05 NOTE — PROGRESS NOTES
Vaughn Melara (:  1970) is a 55 y.o. male,Established patient, here for evaluation of the following chief complaint(s):  Eye Problem (Left eye swelling, oozing, puffy, red x Saturday )      Assessment & Plan :  Visit Diagnoses and Associated Orders         Acute bacterial conjunctivitis of left eye    -  Primary    erythromycin (ROMYCIN) 5 MG/GM ophthalmic ointment [2888]             Hordeolum externum of left upper eyelid                     Patient was seen today for evaluation of right upper eyelid swelling and redness with some associated discharge from the eye  I do not see any evidence of foreign body or corneal abrasion  He has normal visual acuity, no complaint of acute visual disturbances, denies significant pain  We will treat as an external hordeolum of the upper eyelid with accompanying bacterial conjunctivitis  For the hordeolum, I recommend warm compresses to the eye, this should be done for 5-10 minutes at a time, several times throughout the day  For the bacterial conjunctivitis, I have prescribed erythromycin eye ointment, please apply a 1 cm ribbon to the lower left eyelid 4 times daily for the next 5 to 7 days  If you develop any acute vision changes, severe eye pain, flashes, floaters, or halos around lights, please follow-up urgently with an ophthalmologist       Subjective :  HPI     55 y.o. male presents with symptoms of left eye discomfort and discharge which is ongoing for the past 2 days.  He denies any trauma or injury to the eye.  He denies any foreign body sensation.  He denies any blurry vision or acute vision loss.  Denies contact lens use.  Denies any significant pain and instead describes some general discomfort.  He describes that he is waking up with the eye crusted shut and swollen.  He does describe some thick, purulent appearing discharge at times.  He notes some swelling and redness to the left upper eyelid.  He denies flashes, floaters, halos around lights or other

## 2025-05-05 NOTE — PATIENT INSTRUCTIONS
Patient was seen today for evaluation of right upper eyelid swelling and redness with some associated discharge from the eye  I do not see any evidence of foreign body or corneal abrasion  He has normal visual acuity, no complaint of acute visual disturbances, denies significant pain  We will treat as an external hordeolum of the upper eyelid with accompanying bacterial conjunctivitis  For the hordeolum, I recommend warm compresses to the eye, this should be done for 5-10 minutes at a time, several times throughout the day  For the bacterial conjunctivitis, I have prescribed erythromycin eye ointment, please apply a 1 cm ribbon to the lower left eyelid 4 times daily for the next 5 to 7 days  If you develop any acute vision changes, severe eye pain, flashes, floaters, or halos around lights, please follow-up urgently with an ophthalmologist